# Patient Record
Sex: FEMALE | Race: WHITE | NOT HISPANIC OR LATINO | URBAN - NONMETROPOLITAN AREA
[De-identification: names, ages, dates, MRNs, and addresses within clinical notes are randomized per-mention and may not be internally consistent; named-entity substitution may affect disease eponyms.]

---

## 2023-10-06 ENCOUNTER — APPOINTMENT (OUTPATIENT)
Dept: RADIOLOGY | Facility: HOSPITAL | Age: 53
DRG: 177 | End: 2023-10-06
Payer: MEDICARE

## 2023-10-06 ENCOUNTER — HOSPITAL ENCOUNTER (INPATIENT)
Facility: HOSPITAL | Age: 53
LOS: 4 days | Discharge: HOME | DRG: 177 | End: 2023-10-10
Attending: EMERGENCY MEDICINE | Admitting: STUDENT IN AN ORGANIZED HEALTH CARE EDUCATION/TRAINING PROGRAM
Payer: MEDICARE

## 2023-10-06 ENCOUNTER — HOSPITAL ENCOUNTER (OUTPATIENT)
Dept: CARDIOLOGY | Facility: CLINIC | Age: 53
Discharge: HOME | End: 2023-10-06
Payer: MEDICARE

## 2023-10-06 DIAGNOSIS — E86.0 DEHYDRATION: ICD-10-CM

## 2023-10-06 DIAGNOSIS — G35 MULTIPLE SCLEROSIS (MULTI): ICD-10-CM

## 2023-10-06 DIAGNOSIS — J18.9 PNEUMONIA OF RIGHT LOWER LOBE DUE TO INFECTIOUS ORGANISM: Primary | ICD-10-CM

## 2023-10-06 DIAGNOSIS — N31.9 NEUROGENIC BLADDER: Chronic | ICD-10-CM

## 2023-10-06 PROBLEM — E43 SEVERE PROTEIN-CALORIE MALNUTRITION (MULTI): Status: ACTIVE | Noted: 2023-10-06

## 2023-10-06 PROBLEM — R47.01 NONVERBAL: Status: ACTIVE | Noted: 2023-10-06

## 2023-10-06 PROBLEM — D64.9 ANEMIA: Status: ACTIVE | Noted: 2023-10-06

## 2023-10-06 LAB
ALBUMIN SERPL BCP-MCNC: 4.2 G/DL
ALP SERPL-CCNC: 76 U/L
ALT SERPL W P-5'-P-CCNC: 23 U/L
ANION GAP SERPL CALC-SCNC: 15 MMOL/L
APPEARANCE UR: ABNORMAL
AST SERPL W P-5'-P-CCNC: 44 U/L
BASOPHILS # BLD AUTO: 0.01 X10*3/UL (ref 0–0.1)
BASOPHILS NFR BLD AUTO: 0.1 %
BILIRUB SERPL-MCNC: 0.8 MG/DL (ref 0–1.2)
BILIRUB UR STRIP.AUTO-MCNC: NEGATIVE MG/DL
BNP SERPL-MCNC: 329 PG/ML
BUN SERPL-MCNC: 24 MG/DL
CALCIUM SERPL-MCNC: 8.8 MG/DL
CARDIAC TROPONIN I PNL SERPL HS: 16 NG/L
CHLORIDE SERPL-SCNC: 84 MMOL/L
CO2 SERPL-SCNC: 28 MMOL/L
COLOR UR: YELLOW
CREAT SERPL-MCNC: 0.41 MG/DL
EOSINOPHIL # BLD AUTO: 0 X10*3/UL (ref 0–0.7)
EOSINOPHIL NFR BLD AUTO: 0 %
ERYTHROCYTE [DISTWIDTH] IN BLOOD BY AUTOMATED COUNT: 13.1 % (ref 11.5–14.5)
GFR SERPL CREATININE-BSD FRML MDRD: >90 ML/MIN/1.73M*2
GLUCOSE SERPL-MCNC: 97 MG/DL
GLUCOSE UR STRIP.AUTO-MCNC: NEGATIVE MG/DL
HCT VFR BLD AUTO: 26.8 % (ref 36–46)
HGB BLD-MCNC: 9.2 G/DL (ref 12–16)
IMM GRANULOCYTES # BLD AUTO: 0.04 X10*3/UL (ref 0–0.7)
IMM GRANULOCYTES NFR BLD AUTO: 0.3 % (ref 0–0.9)
KETONES UR STRIP.AUTO-MCNC: ABNORMAL MG/DL
LEUKOCYTE ESTERASE UR QL STRIP.AUTO: NEGATIVE
LYMPHOCYTES # BLD AUTO: 0.5 X10*3/UL (ref 1.2–4.8)
LYMPHOCYTES NFR BLD AUTO: 3.2 %
MAGNESIUM SERPL-MCNC: 1.59 MG/DL
MCH RBC QN AUTO: 30.4 PG (ref 26–34)
MCHC RBC AUTO-ENTMCNC: 34.3 G/DL (ref 32–36)
MCV RBC AUTO: 88 FL (ref 80–100)
MONOCYTES # BLD AUTO: 1.05 X10*3/UL (ref 0.1–1)
MONOCYTES NFR BLD AUTO: 6.8 %
MUCOUS THREADS #/AREA URNS AUTO: ABNORMAL /LPF
NEUTROPHILS # BLD AUTO: 13.92 X10*3/UL (ref 1.2–7.7)
NEUTROPHILS NFR BLD AUTO: 89.6 %
NITRITE UR QL STRIP.AUTO: NEGATIVE
NRBC BLD-RTO: 0 /100 WBCS (ref 0–0)
PH UR STRIP.AUTO: 5 [PH]
PLATELET # BLD AUTO: 165 X10*3/UL (ref 150–450)
PMV BLD AUTO: 9.5 FL (ref 7.5–11.5)
POTASSIUM SERPL-SCNC: 4.1 MMOL/L
PROT SERPL-MCNC: 7 G/DL
PROT UR STRIP.AUTO-MCNC: ABNORMAL MG/DL
RBC # BLD AUTO: 3.03 X10*6/UL (ref 4–5.2)
RBC # UR STRIP.AUTO: ABNORMAL /UL
RBC #/AREA URNS AUTO: ABNORMAL /HPF
RENAL EPI CELLS #/AREA UR COMP ASSIST: ABNORMAL /HPF
SARS-COV-2 RNA RESP QL NAA+PROBE: NOT DETECTED
SODIUM SERPL-SCNC: 123 MMOL/L
SP GR UR STRIP.AUTO: 1.02
SQUAMOUS #/AREA URNS AUTO: ABNORMAL /HPF
UROBILINOGEN UR STRIP.AUTO-MCNC: <2 MG/DL
WBC # BLD AUTO: 15.5 X10*3/UL (ref 4.4–11.3)
WBC #/AREA URNS AUTO: ABNORMAL /HPF

## 2023-10-06 PROCEDURE — 1200000002 HC GENERAL ROOM WITH TELEMETRY DAILY: Mod: IPSPLIT

## 2023-10-06 PROCEDURE — 83880 ASSAY OF NATRIURETIC PEPTIDE: CPT | Performed by: EMERGENCY MEDICINE

## 2023-10-06 PROCEDURE — 71045 X-RAY EXAM CHEST 1 VIEW: CPT | Performed by: RADIOLOGY

## 2023-10-06 PROCEDURE — 2500000001 HC RX 250 WO HCPCS SELF ADMINISTERED DRUGS (ALT 637 FOR MEDICARE OP): Mod: IPSPLIT | Performed by: STUDENT IN AN ORGANIZED HEALTH CARE EDUCATION/TRAINING PROGRAM

## 2023-10-06 PROCEDURE — 71045 X-RAY EXAM CHEST 1 VIEW: CPT | Mod: FY

## 2023-10-06 PROCEDURE — 99222 1ST HOSP IP/OBS MODERATE 55: CPT | Performed by: STUDENT IN AN ORGANIZED HEALTH CARE EDUCATION/TRAINING PROGRAM

## 2023-10-06 PROCEDURE — 2500000004 HC RX 250 GENERAL PHARMACY W/ HCPCS (ALT 636 FOR OP/ED)

## 2023-10-06 PROCEDURE — 99285 EMERGENCY DEPT VISIT HI MDM: CPT | Performed by: EMERGENCY MEDICINE

## 2023-10-06 PROCEDURE — 2500000004 HC RX 250 GENERAL PHARMACY W/ HCPCS (ALT 636 FOR OP/ED): Mod: IPSPLIT | Performed by: STUDENT IN AN ORGANIZED HEALTH CARE EDUCATION/TRAINING PROGRAM

## 2023-10-06 PROCEDURE — 70450 CT HEAD/BRAIN W/O DYE: CPT | Performed by: RADIOLOGY

## 2023-10-06 PROCEDURE — 82247 BILIRUBIN TOTAL: CPT | Performed by: EMERGENCY MEDICINE

## 2023-10-06 PROCEDURE — 70450 CT HEAD/BRAIN W/O DYE: CPT | Mod: MG

## 2023-10-06 PROCEDURE — 87086 URINE CULTURE/COLONY COUNT: CPT | Mod: CMCLAB,CONLAB,IPSPLIT | Performed by: EMERGENCY MEDICINE

## 2023-10-06 PROCEDURE — 84484 ASSAY OF TROPONIN QUANT: CPT | Performed by: EMERGENCY MEDICINE

## 2023-10-06 PROCEDURE — 87086 URINE CULTURE/COLONY COUNT: CPT | Mod: CMCLAB,CONLAB | Performed by: EMERGENCY MEDICINE

## 2023-10-06 PROCEDURE — G1004 CDSM NDSC: HCPCS

## 2023-10-06 PROCEDURE — 83735 ASSAY OF MAGNESIUM: CPT | Performed by: EMERGENCY MEDICINE

## 2023-10-06 PROCEDURE — 81001 URINALYSIS AUTO W/SCOPE: CPT | Performed by: EMERGENCY MEDICINE

## 2023-10-06 PROCEDURE — 85025 COMPLETE CBC W/AUTO DIFF WBC: CPT | Performed by: EMERGENCY MEDICINE

## 2023-10-06 PROCEDURE — 93005 ELECTROCARDIOGRAM TRACING: CPT

## 2023-10-06 PROCEDURE — 96365 THER/PROPH/DIAG IV INF INIT: CPT

## 2023-10-06 PROCEDURE — 87635 SARS-COV-2 COVID-19 AMP PRB: CPT | Performed by: EMERGENCY MEDICINE

## 2023-10-06 PROCEDURE — 36415 COLL VENOUS BLD VENIPUNCTURE: CPT | Performed by: EMERGENCY MEDICINE

## 2023-10-06 PROCEDURE — 2500000004 HC RX 250 GENERAL PHARMACY W/ HCPCS (ALT 636 FOR OP/ED): Performed by: EMERGENCY MEDICINE

## 2023-10-06 PROCEDURE — 80053 COMPREHEN METABOLIC PANEL: CPT | Performed by: EMERGENCY MEDICINE

## 2023-10-06 RX ORDER — MIRABEGRON 25 MG/1
25 TABLET, FILM COATED, EXTENDED RELEASE ORAL DAILY
Status: ON HOLD | COMMUNITY
End: 2023-10-06 | Stop reason: DRUGHIGH

## 2023-10-06 RX ORDER — MORPHINE SULFATE 4 MG/ML
4 INJECTION INTRAVENOUS EVERY 4 HOURS PRN
Status: DISCONTINUED | OUTPATIENT
Start: 2023-10-06 | End: 2023-10-10 | Stop reason: HOSPADM

## 2023-10-06 RX ORDER — ONDANSETRON 4 MG/1
8 TABLET, FILM COATED ORAL EVERY 8 HOURS PRN
COMMUNITY

## 2023-10-06 RX ORDER — ONDANSETRON HYDROCHLORIDE 2 MG/ML
4 INJECTION, SOLUTION INTRAVENOUS EVERY 8 HOURS PRN
Status: DISCONTINUED | OUTPATIENT
Start: 2023-10-06 | End: 2023-10-10 | Stop reason: HOSPADM

## 2023-10-06 RX ORDER — CEFTRIAXONE 2 G/50ML
2 INJECTION, SOLUTION INTRAVENOUS EVERY 24 HOURS
Status: COMPLETED | OUTPATIENT
Start: 2023-10-07 | End: 2023-10-09

## 2023-10-06 RX ORDER — MECLIZINE HYDROCHLORIDE 25 MG/1
25 TABLET ORAL 3 TIMES DAILY PRN
COMMUNITY

## 2023-10-06 RX ORDER — AZITHROMYCIN 100 MG/ML
INJECTION, POWDER, LYOPHILIZED, FOR SOLUTION INTRAVENOUS
Status: COMPLETED
Start: 2023-10-06 | End: 2023-10-06

## 2023-10-06 RX ORDER — SOLIFENACIN SUCCINATE 5 MG/1
10 TABLET, FILM COATED ORAL
Status: DISCONTINUED | OUTPATIENT
Start: 2023-10-08 | End: 2023-10-06

## 2023-10-06 RX ORDER — NITROFURANTOIN (MACROCRYSTALS) 100 MG/1
100 CAPSULE ORAL 4 TIMES DAILY
Status: ON HOLD | COMMUNITY
End: 2023-10-06 | Stop reason: DRUGHIGH

## 2023-10-06 RX ORDER — NITROFURANTOIN 25; 75 MG/1; MG/1
100 CAPSULE ORAL DAILY
Status: DISCONTINUED | OUTPATIENT
Start: 2023-10-07 | End: 2023-10-07

## 2023-10-06 RX ORDER — DEXTROSE MONOHYDRATE AND SODIUM CHLORIDE 5; .9 G/100ML; G/100ML
75 INJECTION, SOLUTION INTRAVENOUS CONTINUOUS
Status: DISCONTINUED | OUTPATIENT
Start: 2023-10-06 | End: 2023-10-09

## 2023-10-06 RX ORDER — KETOROLAC TROMETHAMINE 30 MG/ML
15 INJECTION, SOLUTION INTRAMUSCULAR; INTRAVENOUS EVERY 6 HOURS PRN
Status: DISCONTINUED | OUTPATIENT
Start: 2023-10-06 | End: 2023-10-07

## 2023-10-06 RX ORDER — PREGABALIN 100 MG/1
200 CAPSULE ORAL 2 TIMES DAILY
Status: DISCONTINUED | OUTPATIENT
Start: 2023-10-06 | End: 2023-10-10 | Stop reason: HOSPADM

## 2023-10-06 RX ORDER — MECLIZINE HYDROCHLORIDE 25 MG/1
25 TABLET ORAL 3 TIMES DAILY PRN
Status: DISCONTINUED | OUTPATIENT
Start: 2023-10-06 | End: 2023-10-10 | Stop reason: HOSPADM

## 2023-10-06 RX ORDER — ONDANSETRON 4 MG/1
4 TABLET, ORALLY DISINTEGRATING ORAL EVERY 8 HOURS PRN
Status: DISCONTINUED | OUTPATIENT
Start: 2023-10-06 | End: 2023-10-08 | Stop reason: SDUPTHER

## 2023-10-06 RX ORDER — OXYBUTYNIN CHLORIDE 5 MG/1
5 TABLET ORAL 2 TIMES DAILY
Status: DISCONTINUED | OUTPATIENT
Start: 2023-10-06 | End: 2023-10-10 | Stop reason: HOSPADM

## 2023-10-06 RX ORDER — ACETAMINOPHEN 325 MG/1
650 TABLET ORAL EVERY 4 HOURS PRN
Status: DISCONTINUED | OUTPATIENT
Start: 2023-10-06 | End: 2023-10-10 | Stop reason: HOSPADM

## 2023-10-06 RX ORDER — ACETAMINOPHEN 650 MG/1
650 SUPPOSITORY RECTAL EVERY 4 HOURS PRN
Status: DISCONTINUED | OUTPATIENT
Start: 2023-10-06 | End: 2023-10-07

## 2023-10-06 RX ORDER — TALC
3 POWDER (GRAM) TOPICAL DAILY
Status: DISCONTINUED | OUTPATIENT
Start: 2023-10-06 | End: 2023-10-10 | Stop reason: HOSPADM

## 2023-10-06 RX ORDER — SOLIFENACIN SUCCINATE 10 MG/1
10 TABLET, FILM COATED ORAL DAILY
Status: ON HOLD | COMMUNITY
End: 2023-10-06 | Stop reason: DRUGHIGH

## 2023-10-06 RX ORDER — POLYETHYLENE GLYCOL 3350 17 G/17G
17 POWDER, FOR SOLUTION ORAL DAILY
Status: DISCONTINUED | OUTPATIENT
Start: 2023-10-06 | End: 2023-10-10 | Stop reason: HOSPADM

## 2023-10-06 RX ORDER — PREGABALIN 200 MG/1
200 CAPSULE ORAL 2 TIMES DAILY
COMMUNITY

## 2023-10-06 RX ORDER — CEFTRIAXONE 1 G/50ML
1 INJECTION, SOLUTION INTRAVENOUS ONCE
Status: COMPLETED | OUTPATIENT
Start: 2023-10-06 | End: 2023-10-06

## 2023-10-06 RX ORDER — CEFTRIAXONE 2 G/50ML
2 INJECTION, SOLUTION INTRAVENOUS EVERY 24 HOURS
Status: DISCONTINUED | OUTPATIENT
Start: 2023-10-06 | End: 2023-10-06

## 2023-10-06 RX ORDER — ACETAMINOPHEN 160 MG/5ML
650 SOLUTION ORAL EVERY 4 HOURS PRN
Status: DISCONTINUED | OUTPATIENT
Start: 2023-10-06 | End: 2023-10-10 | Stop reason: HOSPADM

## 2023-10-06 RX ORDER — MIRABEGRON 25 MG/1
25 TABLET, FILM COATED, EXTENDED RELEASE ORAL
Status: DISCONTINUED | OUTPATIENT
Start: 2023-10-07 | End: 2023-10-06

## 2023-10-06 RX ADMIN — PREGABALIN 200 MG: 100 CAPSULE ORAL at 21:05

## 2023-10-06 RX ADMIN — MELATONIN TAB 3 MG 3 MG: 3 TAB at 21:05

## 2023-10-06 RX ADMIN — CEFTRIAXONE 1 G: 1 INJECTION, SOLUTION INTRAVENOUS at 12:15

## 2023-10-06 RX ADMIN — AZITHROMYCIN 500 MG: 500 INJECTION, POWDER, LYOPHILIZED, FOR SOLUTION INTRAVENOUS at 14:09

## 2023-10-06 RX ADMIN — SODIUM CHLORIDE 1000 ML: 9 INJECTION, SOLUTION INTRAVENOUS at 11:19

## 2023-10-06 RX ADMIN — OXYBUTYNIN CHLORIDE 5 MG: 5 TABLET ORAL at 21:05

## 2023-10-06 SDOH — SOCIAL STABILITY: SOCIAL INSECURITY: WERE YOU ABLE TO COMPLETE ALL THE BEHAVIORAL HEALTH SCREENINGS?: NO

## 2023-10-06 ASSESSMENT — COGNITIVE AND FUNCTIONAL STATUS - GENERAL
EATING MEALS: A LOT
TOILETING: TOTAL
PATIENT BASELINE BEDBOUND: NO
PERSONAL GROOMING: TOTAL
DRESSING REGULAR LOWER BODY CLOTHING: TOTAL
MOBILITY SCORE: 6
CLIMB 3 TO 5 STEPS WITH RAILING: TOTAL
MOVING TO AND FROM BED TO CHAIR: TOTAL
DAILY ACTIVITIY SCORE: 7
HELP NEEDED FOR BATHING: TOTAL
MOVING FROM LYING ON BACK TO SITTING ON SIDE OF FLAT BED WITH BEDRAILS: TOTAL
TURNING FROM BACK TO SIDE WHILE IN FLAT BAD: TOTAL
STANDING UP FROM CHAIR USING ARMS: TOTAL
WALKING IN HOSPITAL ROOM: TOTAL
DRESSING REGULAR UPPER BODY CLOTHING: TOTAL

## 2023-10-06 ASSESSMENT — ACTIVITIES OF DAILY LIVING (ADL)
JUDGMENT_ADEQUATE_SAFELY_COMPLETE_DAILY_ACTIVITIES: UNABLE TO ASSESS
HEARING - RIGHT EAR: FUNCTIONAL
ASSISTIVE_DEVICE: WHEELCHAIR
WALKS IN HOME: DEPENDENT
BATHING: DEPENDENT
TOILETING: DEPENDENT
FEEDING YOURSELF: DEPENDENT
GROOMING: DEPENDENT
ADEQUATE_TO_COMPLETE_ADL: YES
PATIENT'S MEMORY ADEQUATE TO SAFELY COMPLETE DAILY ACTIVITIES?: UNABLE TO ASSESS
HEARING - LEFT EAR: FUNCTIONAL
DRESSING YOURSELF: DEPENDENT

## 2023-10-06 ASSESSMENT — PAIN SCALES - PAIN ASSESSMENT IN ADVANCED DEMENTIA (PAINAD)
BODYLANGUAGE: RELAXED
BREATHING: NORMAL
FACIALEXPRESSION: SMILING OR INEXPRESSIVE
CONSOLABILITY: NO NEED TO CONSOLE
FACIALEXPRESSION: SMILING OR INEXPRESSIVE
TOTALSCORE: 0
CONSOLABILITY: NO NEED TO CONSOLE
BREATHING: NORMAL
TOTALSCORE: 0
BODYLANGUAGE: RELAXED

## 2023-10-06 ASSESSMENT — COLUMBIA-SUICIDE SEVERITY RATING SCALE - C-SSRS
6. HAVE YOU EVER DONE ANYTHING, STARTED TO DO ANYTHING, OR PREPARED TO DO ANYTHING TO END YOUR LIFE?: NO
1. IN THE PAST MONTH, HAVE YOU WISHED YOU WERE DEAD OR WISHED YOU COULD GO TO SLEEP AND NOT WAKE UP?: NO
2. HAVE YOU ACTUALLY HAD ANY THOUGHTS OF KILLING YOURSELF?: NO

## 2023-10-06 ASSESSMENT — PAIN - FUNCTIONAL ASSESSMENT
PAIN_FUNCTIONAL_ASSESSMENT: UNABLE TO SELF-REPORT
PAIN_FUNCTIONAL_ASSESSMENT: PAINAD (PAIN ASSESSMENT IN ADVANCED DEMENTIA SCALE)

## 2023-10-06 NOTE — ED PROVIDER NOTES
Department of Emergency Medicine   ED  Provider Note  Admit Date/RoomTime: 10/6/2023 10:25 AM  ED Room: 07/[address]              History of Present Illness:   Shameka Zaragoza is a 53 y.o. female presenting to the ED for decreased responsiveness, beginning last night.  The complaint has been persistent, moderate in severity, and worsened by nothing.  Patient with history of multiple sclerosis.  Patient has been seen here previously.  Nursing staff states this is similar to her usual presentation.  Patient is nonverbal.  She is unable to answer any questions.  There is no family members here at this time.  She does have a baclofen pump.  The  has arrived.  The  reports the patient has been less verbal.  Has been eating and drinking less.  Refusing some medications.  Has been less functional/interactive over the last few days.  No fever.      Review of Systems:   Pertinent positives and review of systems as noted above.  Remaining 10 review of systems is negative or noncontributory to today's episode of care.        --------------------------------------------- PAST HISTORY ---------------------------------------------  Past Medical History:  has a past medical history of Multiple sclerosis (CMS/[facility]).    Past Surgical History:  has no past surgical history on file.  No reported recent surgeries.    Social History:  reports that she has never smoked. She has never used smokeless tobacco.    Family History: family history is not on file. Unless otherwise noted, family history is non contributory    The patient’s home medications have been reviewed.    Allergies: Bactrim [sulfamethoxazole-trimethoprim], Prednisone, Sulfa (sulfonamide antibiotics), and Levaquin [levofloxacin]    -------------------------------------------------- RESULTS -------------------------------------------------  All laboratory and radiology results have been personally reviewed by myself   LABS:  Labs Reviewed    CBC WITH AUTO DIFFERENTIAL - Abnormal       Result Value    WBC 15.5 (*)     nRBC 0.0      RBC 3.03 (*)     Hemoglobin 9.2 (*)     Hematocrit 26.8 (*)     MCV 88      MCH 30.4      MCHC 34.3      RDW 13.1      Platelets 165      MPV 9.5      Neutrophils % 89.6      Immature Granulocytes %, Automated 0.3      Lymphocytes % 3.2      Monocytes % 6.8      Eosinophils % 0.0      Basophils % 0.1      Neutrophils Absolute 13.92 (*)     Immature Granulocytes Absolute, Automated 0.04      Lymphocytes Absolute 0.50 (*)     Monocytes Absolute 1.05 (*)     Eosinophils Absolute 0.00      Basophils Absolute 0.01     URINALYSIS WITH REFLEX MICROSCOPIC AND CULTURE - Abnormal    Color, Urine Yellow      Appearance, Urine Hazy (*)     Specific Gravity, Urine 1.023      pH, Urine 5.0      Protein, Urine 30 (1+) (*)     Glucose, Urine NEGATIVE      Blood, Urine SMALL (1+) (*)     Ketones, Urine 80 (2+) (*)     Bilirubin, Urine NEGATIVE      Urobilinogen, Urine <2.0      Nitrite, Urine NEGATIVE      Leukocyte Esterase, Urine NEGATIVE     URINALYSIS MICROSCOPIC WITH REFLEX CULTURE - Abnormal    WBC, Urine 11-20 (*)     RBC, Urine 1-2      Squamous Epithelial Cells, Urine 1-9 (SPARSE)      Renal Epithelial Cells, Urine 1-2 (FEW)      Mucus, Urine 1+     SARS-COV-2 PCR, SYMPTOMATIC - Normal    Coronavirus 2019, PCR Not Detected      Narrative:     This assay has received FDA Emergency Use Authorization (EUA) and is only authorized for the duration of time that circumstances exist to justify the authorization of the emergency use of in vitro diagnostic tests for the detection of SARS-CoV-2 virus and/or diagnosis of COVID-19 infection under section 564(b)(1) of the Act, 21 U.S.C. 360bbb-3(b)(1). This assay is an in vitro diagnostic nucleic acid amplification test for the qualitative detection of SARS-CoV-2 from nasopharyngeal specimens and has been validated for use at [facility]. Negative results do not preclude  COVID-19 infections and should not be used as the sole basis for diagnosis, treatment, or other management decisions.     URINE CULTURE   COMPREHENSIVE METABOLIC PANEL    Glucose 97      Sodium 123      Potassium 4.1      Chloride 84      Bicarbonate 28      Anion Gap 15      Urea Nitrogen 24      Creatinine 0.41      eGFR >90      Calcium 8.8      Albumin 4.2      Alkaline Phosphatase 76      Total Protein 7.0      AST 44      Bilirubin, Total 0.8      ALT 23     MAGNESIUM    Magnesium 1.59     TROPONIN I, HIGH SENSITIVITY    Troponin I, High Sensitivity 16      Narrative:     Less than 99th percentile of normal range cutoff-  Female and children under 18 years old <14 ng/L; Male <21 ng/L: Negative  Repeat testing should be performed if clinically indicated.     Female and children under 18 years old 14-50 ng/L; Male 21-50 ng/L:  Consistent with possible cardiac damage and possible increased clinical   risk. Serial measurements may help to assess extent of myocardial damage.     >50 ng/L: Consistent with cardiac damage, increased clinical risk and  myocardial infarction. Serial measurements may help assess extent of   myocardial damage.      NOTE: Children less than 1 year old may have higher baseline troponin   levels and results should be interpreted in conjunction with the overall   clinical context.     NOTE: Troponin I testing is performed using a different   testing methodology at [facility] than at other   [facility]. Direct result comparisons should only   be made within the same method.   B-TYPE NATRIURETIC PEPTIDE          Narrative:        <100 pg/mL - Heart failure unlikely  100-299 pg/mL - Intermediate probability of acute heart                  failure exacerbation. Correlate with clinical                  context and patient history.    >=300 pg/mL - Heart Failure likely. Correlate with clinical                  context and patient history.    BNP testing is performed using  "different testing methodology at [facility] than at other [facility]. Direct result comparisons should only be made within the same method.      URINALYSIS WITH REFLEX MICROSCOPIC AND CULTURE    Narrative:     The following orders were created for panel order Urinalysis with Reflex Microscopic and Culture.  Procedure                               Abnormality         Status                     ---------                               -----------         ------                     Urinalysis with Reflex M...[[id number]]  Abnormal            Final result               Extra Urine Gray Tube[[id number]]                            In process                   Please view results for these tests on the individual orders.   EXTRA URINE GRAY TUBE   GRAY TOP         RADIOLOGY:  Interpreted by Radiologist.  XR chest 1 view   Final Result   Right mid to lower lung pneumonia.             Signed by: Mushtaq Squires 10/6/2023 11:52 AM   Dictation workstation:   [id number]      CT head wo IV contrast   Final Result   No acute intracranial pathology identified.                  Signed by: Mushtaq Squires 10/6/2023 11:50 AM   Dictation workstation:   [id number]          No results found for this or any previous visit (from the past 4464 hour(s)).  ------------------------- NURSING NOTES AND VITALS REVIEWED ---------------------------   The nursing notes within the ED encounter and vital signs as below have been reviewed.   /54   Pulse 76   Temp 36.5 °C (97.7 °F)   Resp 16   Ht 1.6 m (5' 3\")   Wt 59.1 kg (130 lb 4.7 oz)   SpO2 100%   BMI 23.08 kg/m²   Oxygen Saturation Interpretation: Normal      ---------------------------------------------------PHYSICAL EXAM--------------------------------------    Constitutional/General: Alert and oriented x3, well appearing, non toxic in NAD  Head: Normocephalic and atraumatic  Eyes: PERRL, EOMI, conjunctiva normal, sclera non icteric  Mouth: Oropharynx clear, handling " secretions, no trismus, no asymmetry of the posterior oropharynx or uvular edema  Neck: Supple, full ROM, non tender to palpation in the midline, no stridor, no crepitus, no meningeal signs  Respiratory: Lungs clear to auscultation bilaterally, no wheezes, rales, or rhonchi. Not in respiratory distress  Cardiovascular:  Regular rate. Regular rhythm. No murmurs, gallops, or rubs. 2+ distal pulses  Chest: No chest wall tenderness  GI:  Abdomen Soft, Non tender, Non distended.  +BS. No organomegaly, no palpable masses,  No rebound, guarding, or rigidity.  No acute peritoneal signs.  Musculoskeletal: Moves all extremities x 4. Warm and well perfused, no clubbing, cyanosis, or edema. Capillary refill <3 seconds  Integument: skin warm and dry. No rashes.   Lymphatic: no lymphadenopathy noted  Neurologic: GCS 15, no focal deficits, symmetric strength 5/5 in the upper and lower extremities bilaterally  Psychiatric: Normal Affect    Procedures  An EKG at 1210 interpreted by me at 1215.  Normal sinus rhythm 91 bpm.  Normal axis.  MS, QRS, QT intervals are grossly normal.  No acute ST segment elevation or depression.  No evidence of acute ischemia.  No STEMI.  Baseline artifact.  ------------------------------ ED COURSE/MEDICAL DECISION MAKING----------------------    Medical Decision Making:   Patient arrives from home via EMS.  An IV is started.  Multiple labs are ordered.  A COVID is ordered.  I will give her 500 cc fluid bolus.  White count is 15,000, chemistries are unremarkable.  Chest x-ray shows right mid and lower lobe pneumonia.  Patient has advanced multiple sclerosis.  She is not eating or drinking or doing well at home.  I discussed with the family agreeable for admission.  I discussed with the on-call hospitalist Dr. Paula and accepted for admission    Diagnoses as of 10/06/23 1416   Pneumonia of right lower lobe due to infectious organism   Multiple sclerosis (CMS/[facility])   Dehydration      Counseling:   The  emergency provider has spoken with the family member patient and  and discussed today’s results, in addition to providing specific details for the plan of care and counseling regarding the diagnosis and prognosis.  Questions are answered at this time and they are agreeable with the plan.      --------------------------------- IMPRESSION AND DISPOSITION ---------------------------------        IMPRESSION  No diagnosis found.    DISPOSITION  Disposition: Admit to telemetry  Patient condition is stable      Billing Provider Critical Care Time: zerp minutes     Darryl Machado, DO  10/06/23 1418       Darryl Machado, DO  10/06/23 1555

## 2023-10-07 PROBLEM — N32.81 OVERACTIVE BLADDER: Status: ACTIVE | Noted: 2019-09-08

## 2023-10-07 PROBLEM — K21.9 GERD (GASTROESOPHAGEAL REFLUX DISEASE): Status: ACTIVE | Noted: 2023-10-07

## 2023-10-07 PROBLEM — M79.18 MYOFASCIAL PAIN: Status: ACTIVE | Noted: 2021-06-30

## 2023-10-07 PROBLEM — Z98.890 S/P INSERTION OF INTRATHECAL PUMP: Status: ACTIVE | Noted: 2021-06-30

## 2023-10-07 PROBLEM — M79.2 NEUROPATHIC PAIN: Status: ACTIVE | Noted: 2021-06-30

## 2023-10-07 LAB
ALBUMIN SERPL BCP-MCNC: 3.4 G/DL (ref 3.4–5)
ALBUMIN SERPL BCP-MCNC: 3.5 G/DL (ref 3.4–5)
ALP SERPL-CCNC: 58 U/L (ref 33–110)
ALT SERPL W P-5'-P-CCNC: 20 U/L (ref 7–45)
ANION GAP SERPL CALC-SCNC: 10 MMOL/L (ref 10–20)
ANION GAP SERPL CALC-SCNC: 13 MMOL/L (ref 10–20)
AST SERPL W P-5'-P-CCNC: 39 U/L (ref 9–39)
BACTERIA UR CULT: NO GROWTH
BILIRUB DIRECT SERPL-MCNC: 0.1 MG/DL (ref 0–0.3)
BILIRUB SERPL-MCNC: 0.5 MG/DL (ref 0–1.2)
BUN SERPL-MCNC: 11 MG/DL (ref 6–23)
BUN SERPL-MCNC: 14 MG/DL (ref 6–23)
CALCIUM SERPL-MCNC: 8.4 MG/DL (ref 8.6–10.3)
CALCIUM SERPL-MCNC: 8.4 MG/DL (ref 8.6–10.3)
CHLORIDE SERPL-SCNC: 101 MMOL/L (ref 98–107)
CHLORIDE SERPL-SCNC: 103 MMOL/L (ref 98–107)
CO2 SERPL-SCNC: 27 MMOL/L (ref 21–32)
CO2 SERPL-SCNC: 28 MMOL/L (ref 21–32)
CREAT SERPL-MCNC: 0.36 MG/DL (ref 0.5–1.05)
CREAT SERPL-MCNC: 0.36 MG/DL (ref 0.5–1.05)
ERYTHROCYTE [DISTWIDTH] IN BLOOD BY AUTOMATED COUNT: 13.7 % (ref 11.5–14.5)
GFR SERPL CREATININE-BSD FRML MDRD: >90 ML/MIN/1.73M*2
GFR SERPL CREATININE-BSD FRML MDRD: >90 ML/MIN/1.73M*2
GLUCOSE SERPL-MCNC: 124 MG/DL (ref 74–99)
GLUCOSE SERPL-MCNC: 81 MG/DL (ref 74–99)
HCT VFR BLD AUTO: 24.8 % (ref 36–46)
HGB BLD-MCNC: 8.3 G/DL (ref 12–16)
MAGNESIUM SERPL-MCNC: 1.64 MG/DL (ref 1.6–2.4)
MAGNESIUM SERPL-MCNC: 1.74 MG/DL (ref 1.6–2.4)
MCH RBC QN AUTO: 30.3 PG (ref 26–34)
MCHC RBC AUTO-ENTMCNC: 33.5 G/DL (ref 32–36)
MCV RBC AUTO: 91 FL (ref 80–100)
NRBC BLD-RTO: 0 /100 WBCS (ref 0–0)
PHOSPHATE SERPL-MCNC: 1.9 MG/DL (ref 2.5–4.9)
PHOSPHATE SERPL-MCNC: 2.4 MG/DL (ref 2.5–4.9)
PLATELET # BLD AUTO: 144 X10*3/UL (ref 150–450)
PMV BLD AUTO: 8.9 FL (ref 7.5–11.5)
POTASSIUM SERPL-SCNC: 2.8 MMOL/L (ref 3.5–5.3)
POTASSIUM SERPL-SCNC: 3.4 MMOL/L (ref 3.5–5.3)
PROT SERPL-MCNC: 5.9 G/DL (ref 6.4–8.2)
RBC # BLD AUTO: 2.74 X10*6/UL (ref 4–5.2)
SODIUM SERPL-SCNC: 138 MMOL/L (ref 136–145)
SODIUM SERPL-SCNC: 138 MMOL/L (ref 136–145)
TSH SERPL-ACNC: 2.07 MIU/L (ref 0.44–3.98)
WBC # BLD AUTO: 7.5 X10*3/UL (ref 4.4–11.3)

## 2023-10-07 PROCEDURE — 82374 ASSAY BLOOD CARBON DIOXIDE: CPT | Mod: IPSPLIT | Performed by: STUDENT IN AN ORGANIZED HEALTH CARE EDUCATION/TRAINING PROGRAM

## 2023-10-07 PROCEDURE — 82435 ASSAY OF BLOOD CHLORIDE: CPT | Mod: IPSPLIT | Performed by: STUDENT IN AN ORGANIZED HEALTH CARE EDUCATION/TRAINING PROGRAM

## 2023-10-07 PROCEDURE — 83735 ASSAY OF MAGNESIUM: CPT | Mod: IPSPLIT | Performed by: STUDENT IN AN ORGANIZED HEALTH CARE EDUCATION/TRAINING PROGRAM

## 2023-10-07 PROCEDURE — 84100 ASSAY OF PHOSPHORUS: CPT | Mod: IPSPLIT | Performed by: STUDENT IN AN ORGANIZED HEALTH CARE EDUCATION/TRAINING PROGRAM

## 2023-10-07 PROCEDURE — 84443 ASSAY THYROID STIM HORMONE: CPT | Mod: IPSPLIT | Performed by: STUDENT IN AN ORGANIZED HEALTH CARE EDUCATION/TRAINING PROGRAM

## 2023-10-07 PROCEDURE — 82040 ASSAY OF SERUM ALBUMIN: CPT | Mod: IPSPLIT | Performed by: STUDENT IN AN ORGANIZED HEALTH CARE EDUCATION/TRAINING PROGRAM

## 2023-10-07 PROCEDURE — 80053 COMPREHEN METABOLIC PANEL: CPT | Mod: IPSPLIT | Performed by: STUDENT IN AN ORGANIZED HEALTH CARE EDUCATION/TRAINING PROGRAM

## 2023-10-07 PROCEDURE — 36415 COLL VENOUS BLD VENIPUNCTURE: CPT | Mod: IPSPLIT | Performed by: STUDENT IN AN ORGANIZED HEALTH CARE EDUCATION/TRAINING PROGRAM

## 2023-10-07 PROCEDURE — 84075 ASSAY ALKALINE PHOSPHATASE: CPT | Mod: IPSPLIT | Performed by: STUDENT IN AN ORGANIZED HEALTH CARE EDUCATION/TRAINING PROGRAM

## 2023-10-07 PROCEDURE — 1200000002 HC GENERAL ROOM WITH TELEMETRY DAILY: Mod: IPSPLIT

## 2023-10-07 PROCEDURE — 2500000004 HC RX 250 GENERAL PHARMACY W/ HCPCS (ALT 636 FOR OP/ED): Mod: IPSPLIT | Performed by: STUDENT IN AN ORGANIZED HEALTH CARE EDUCATION/TRAINING PROGRAM

## 2023-10-07 PROCEDURE — 2500000005 HC RX 250 GENERAL PHARMACY W/O HCPCS: Mod: IPSPLIT | Performed by: STUDENT IN AN ORGANIZED HEALTH CARE EDUCATION/TRAINING PROGRAM

## 2023-10-07 PROCEDURE — 2500000001 HC RX 250 WO HCPCS SELF ADMINISTERED DRUGS (ALT 637 FOR MEDICARE OP): Mod: IPSPLIT | Performed by: STUDENT IN AN ORGANIZED HEALTH CARE EDUCATION/TRAINING PROGRAM

## 2023-10-07 PROCEDURE — 94760 N-INVAS EAR/PLS OXIMETRY 1: CPT | Mod: IPSPLIT

## 2023-10-07 PROCEDURE — 85027 COMPLETE CBC AUTOMATED: CPT | Mod: IPSPLIT | Performed by: STUDENT IN AN ORGANIZED HEALTH CARE EDUCATION/TRAINING PROGRAM

## 2023-10-07 PROCEDURE — 99232 SBSQ HOSP IP/OBS MODERATE 35: CPT | Performed by: STUDENT IN AN ORGANIZED HEALTH CARE EDUCATION/TRAINING PROGRAM

## 2023-10-07 PROCEDURE — 80069 RENAL FUNCTION PANEL: CPT | Mod: IPSPLIT | Performed by: STUDENT IN AN ORGANIZED HEALTH CARE EDUCATION/TRAINING PROGRAM

## 2023-10-07 RX ORDER — ENOXAPARIN SODIUM 100 MG/ML
40 INJECTION SUBCUTANEOUS DAILY
Status: DISCONTINUED | OUTPATIENT
Start: 2023-10-08 | End: 2023-10-10 | Stop reason: HOSPADM

## 2023-10-07 RX ORDER — POTASSIUM CHLORIDE 14.9 MG/ML
20 INJECTION INTRAVENOUS
Status: COMPLETED | OUTPATIENT
Start: 2023-10-07 | End: 2023-10-07

## 2023-10-07 RX ADMIN — DEXTROSE AND SODIUM CHLORIDE 75 ML/HR: 5; 900 INJECTION, SOLUTION INTRAVENOUS at 16:09

## 2023-10-07 RX ADMIN — AZITHROMYCIN 500 MG: 500 INJECTION, POWDER, LYOPHILIZED, FOR SOLUTION INTRAVENOUS at 13:21

## 2023-10-07 RX ADMIN — POTASSIUM PHOSPHATE, MONOBASIC POTASSIUM PHOSPHATE, DIBASIC 21 MMOL: 224; 236 INJECTION, SOLUTION, CONCENTRATE INTRAVENOUS at 14:21

## 2023-10-07 RX ADMIN — DEXTROSE AND SODIUM CHLORIDE 75 ML/HR: 5; 900 INJECTION, SOLUTION INTRAVENOUS at 02:02

## 2023-10-07 RX ADMIN — POTASSIUM CHLORIDE 20 MEQ: 14.9 INJECTION, SOLUTION INTRAVENOUS at 09:20

## 2023-10-07 RX ADMIN — POTASSIUM CHLORIDE 20 MEQ: 14.9 INJECTION, SOLUTION INTRAVENOUS at 11:36

## 2023-10-07 RX ADMIN — CEFTRIAXONE 2 G: 2 INJECTION, SOLUTION INTRAVENOUS at 11:41

## 2023-10-07 SDOH — SOCIAL STABILITY: SOCIAL INSECURITY: DO YOU FEEL UNSAFE GOING BACK TO THE PLACE WHERE YOU ARE LIVING?: NO

## 2023-10-07 SDOH — SOCIAL STABILITY: SOCIAL INSECURITY: ABUSE: ADULT

## 2023-10-07 SDOH — SOCIAL STABILITY: SOCIAL INSECURITY: ARE THERE ANY APPARENT SIGNS OF INJURIES/BEHAVIORS THAT COULD BE RELATED TO ABUSE/NEGLECT?: NO

## 2023-10-07 SDOH — SOCIAL STABILITY: SOCIAL INSECURITY: ARE YOU OR HAVE YOU BEEN THREATENED OR ABUSED PHYSICALLY, EMOTIONALLY, OR SEXUALLY BY ANYONE?: NO

## 2023-10-07 SDOH — SOCIAL STABILITY: SOCIAL INSECURITY: DOES ANYONE TRY TO KEEP YOU FROM HAVING/CONTACTING OTHER FRIENDS OR DOING THINGS OUTSIDE YOUR HOME?: NO

## 2023-10-07 SDOH — SOCIAL STABILITY: SOCIAL INSECURITY: HAS ANYONE EVER THREATENED TO HURT YOUR FAMILY OR YOUR PETS?: NO

## 2023-10-07 SDOH — SOCIAL STABILITY: SOCIAL INSECURITY: DO YOU FEEL ANYONE HAS EXPLOITED OR TAKEN ADVANTAGE OF YOU FINANCIALLY OR OF YOUR PERSONAL PROPERTY?: NO

## 2023-10-07 SDOH — SOCIAL STABILITY: SOCIAL INSECURITY: HAVE YOU HAD THOUGHTS OF HARMING ANYONE ELSE?: NO

## 2023-10-07 ASSESSMENT — PAIN SCALES - PAIN ASSESSMENT IN ADVANCED DEMENTIA (PAINAD)
CONSOLABILITY: NO NEED TO CONSOLE
FACIALEXPRESSION: SMILING OR INEXPRESSIVE
TOTALSCORE: 0
BREATHING: NORMAL
CONSOLABILITY: NO NEED TO CONSOLE
FACIALEXPRESSION: SMILING OR INEXPRESSIVE
BODYLANGUAGE: RELAXED
BREATHING: NORMAL
TOTALSCORE: 0
BODYLANGUAGE: RELAXED

## 2023-10-07 ASSESSMENT — COGNITIVE AND FUNCTIONAL STATUS - GENERAL
WALKING IN HOSPITAL ROOM: TOTAL
DAILY ACTIVITIY SCORE: 7
PERSONAL GROOMING: TOTAL
DRESSING REGULAR UPPER BODY CLOTHING: TOTAL
TURNING FROM BACK TO SIDE WHILE IN FLAT BAD: TOTAL
MOVING FROM LYING ON BACK TO SITTING ON SIDE OF FLAT BED WITH BEDRAILS: TOTAL
WALKING IN HOSPITAL ROOM: TOTAL
DRESSING REGULAR LOWER BODY CLOTHING: TOTAL
TURNING FROM BACK TO SIDE WHILE IN FLAT BAD: TOTAL
TOILETING: TOTAL
TOILETING: TOTAL
HELP NEEDED FOR BATHING: TOTAL
HELP NEEDED FOR BATHING: TOTAL
STANDING UP FROM CHAIR USING ARMS: TOTAL
MOBILITY SCORE: 6
PERSONAL GROOMING: TOTAL
MOVING FROM LYING ON BACK TO SITTING ON SIDE OF FLAT BED WITH BEDRAILS: TOTAL
MOBILITY SCORE: 6
MOVING TO AND FROM BED TO CHAIR: TOTAL
EATING MEALS: A LOT
EATING MEALS: A LOT
DRESSING REGULAR UPPER BODY CLOTHING: TOTAL
STANDING UP FROM CHAIR USING ARMS: TOTAL
DAILY ACTIVITIY SCORE: 7
DRESSING REGULAR LOWER BODY CLOTHING: TOTAL
CLIMB 3 TO 5 STEPS WITH RAILING: TOTAL
MOVING TO AND FROM BED TO CHAIR: TOTAL
CLIMB 3 TO 5 STEPS WITH RAILING: TOTAL

## 2023-10-07 ASSESSMENT — ACTIVITIES OF DAILY LIVING (ADL)
ASSISTIVE_DEVICE: WHEELCHAIR
TOILETING: DEPENDENT
WALKS IN HOME: DEPENDENT
PATIENT'S MEMORY ADEQUATE TO SAFELY COMPLETE DAILY ACTIVITIES?: UNABLE TO ASSESS
BATHING: DEPENDENT
HEARING - RIGHT EAR: FUNCTIONAL
ADEQUATE_TO_COMPLETE_ADL: YES
JUDGMENT_ADEQUATE_SAFELY_COMPLETE_DAILY_ACTIVITIES: UNABLE TO ASSESS
GROOMING: DEPENDENT
HEARING - LEFT EAR: FUNCTIONAL
DRESSING YOURSELF: DEPENDENT
FEEDING YOURSELF: NEEDS ASSISTANCE

## 2023-10-07 ASSESSMENT — PATIENT HEALTH QUESTIONNAIRE - PHQ9
1. LITTLE INTEREST OR PLEASURE IN DOING THINGS: NOT AT ALL
2. FEELING DOWN, DEPRESSED OR HOPELESS: NOT AT ALL
SUM OF ALL RESPONSES TO PHQ9 QUESTIONS 1 & 2: 0

## 2023-10-07 ASSESSMENT — LIFESTYLE VARIABLES
AUDIT-C TOTAL SCORE: 0
AUDIT-C TOTAL SCORE: 0
HOW OFTEN DO YOU HAVE 6 OR MORE DRINKS ON ONE OCCASION: NEVER
HOW OFTEN DO YOU HAVE A DRINK CONTAINING ALCOHOL: NEVER
SKIP TO QUESTIONS 9-10: 1
HOW MANY STANDARD DRINKS CONTAINING ALCOHOL DO YOU HAVE ON A TYPICAL DAY: PATIENT DOES NOT DRINK

## 2023-10-07 ASSESSMENT — PAIN - FUNCTIONAL ASSESSMENT
PAIN_FUNCTIONAL_ASSESSMENT: PAINAD (PAIN ASSESSMENT IN ADVANCED DEMENTIA SCALE)
PAIN_FUNCTIONAL_ASSESSMENT: PAINAD (PAIN ASSESSMENT IN ADVANCED DEMENTIA SCALE)

## 2023-10-07 NOTE — NURSING NOTE
Polo cath 14Fr inserted d/t neurogenic bladder from MS diagnosis.  at bedside states he straight cath patient at home. Bladder scan done and showed > 555ml. Patient tolerated procedure well.

## 2023-10-07 NOTE — CARE PLAN
The patient's goals for the shift include  Safety, comfort, and fall prevention.    The clinical goals for the shift include      Over the shift, the patient rested, tolerated IV ATB and when wake spoke a couple words. Patient remained NPO this shift Call light in reach.

## 2023-10-07 NOTE — NURSING NOTE
2113 pt awake and alert resting in bed. Pt is verbal with nurse and appears calm. Evening meds administered with small sips of water, no coughing noted. IV clear and patent. Lung sounds diminished. Personal items and call light within reach. Denies any pain or needs att.

## 2023-10-08 PROBLEM — M79.18 MYOFASCIAL PAIN: Chronic | Status: ACTIVE | Noted: 2021-06-30

## 2023-10-08 PROBLEM — D64.9 ANEMIA: Chronic | Status: ACTIVE | Noted: 2023-10-06

## 2023-10-08 PROBLEM — R13.10 DYSPHAGIA: Status: ACTIVE | Noted: 2023-10-08

## 2023-10-08 PROBLEM — Z98.890 S/P INSERTION OF INTRATHECAL PUMP: Chronic | Status: ACTIVE | Noted: 2021-06-30

## 2023-10-08 PROBLEM — G35 MULTIPLE SCLEROSIS (MULTI): Chronic | Status: ACTIVE | Noted: 2023-10-06

## 2023-10-08 PROBLEM — K21.9 GERD (GASTROESOPHAGEAL REFLUX DISEASE): Chronic | Status: ACTIVE | Noted: 2023-10-07

## 2023-10-08 PROBLEM — M79.2 NEUROPATHIC PAIN: Chronic | Status: ACTIVE | Noted: 2021-06-30

## 2023-10-08 PROBLEM — N32.81 OVERACTIVE BLADDER: Chronic | Status: ACTIVE | Noted: 2019-09-08

## 2023-10-08 PROBLEM — E86.0 DEHYDRATION: Status: RESOLVED | Noted: 2023-10-06 | Resolved: 2023-10-08

## 2023-10-08 LAB
ALBUMIN SERPL BCP-MCNC: 3.2 G/DL (ref 3.4–5)
ALP SERPL-CCNC: 54 U/L (ref 33–110)
ALT SERPL W P-5'-P-CCNC: 18 U/L (ref 7–45)
ANION GAP SERPL CALC-SCNC: 10 MMOL/L (ref 10–20)
AST SERPL W P-5'-P-CCNC: 23 U/L (ref 9–39)
BILIRUB DIRECT SERPL-MCNC: 0.1 MG/DL (ref 0–0.3)
BILIRUB SERPL-MCNC: 0.5 MG/DL (ref 0–1.2)
BUN SERPL-MCNC: 9 MG/DL (ref 6–23)
CALCIUM SERPL-MCNC: 8.3 MG/DL (ref 8.6–10.3)
CHLORIDE SERPL-SCNC: 104 MMOL/L (ref 98–107)
CO2 SERPL-SCNC: 29 MMOL/L (ref 21–32)
CREAT SERPL-MCNC: 0.31 MG/DL (ref 0.5–1.05)
ERYTHROCYTE [DISTWIDTH] IN BLOOD BY AUTOMATED COUNT: 13.8 % (ref 11.5–14.5)
GFR SERPL CREATININE-BSD FRML MDRD: >90 ML/MIN/1.73M*2
GLUCOSE SERPL-MCNC: 106 MG/DL (ref 74–99)
HCT VFR BLD AUTO: 27.3 % (ref 36–46)
HGB BLD-MCNC: 8.9 G/DL (ref 12–16)
MAGNESIUM SERPL-MCNC: 1.6 MG/DL (ref 1.6–2.4)
MCH RBC QN AUTO: 30 PG (ref 26–34)
MCHC RBC AUTO-ENTMCNC: 32.6 G/DL (ref 32–36)
MCV RBC AUTO: 92 FL (ref 80–100)
NRBC BLD-RTO: 0 /100 WBCS (ref 0–0)
PHOSPHATE SERPL-MCNC: 2.2 MG/DL (ref 2.5–4.9)
PLATELET # BLD AUTO: 162 X10*3/UL (ref 150–450)
PMV BLD AUTO: 9.8 FL (ref 7.5–11.5)
POTASSIUM SERPL-SCNC: 3.2 MMOL/L (ref 3.5–5.3)
PROT SERPL-MCNC: 5.7 G/DL (ref 6.4–8.2)
RBC # BLD AUTO: 2.97 X10*6/UL (ref 4–5.2)
SODIUM SERPL-SCNC: 140 MMOL/L (ref 136–145)
WBC # BLD AUTO: 6.9 X10*3/UL (ref 4.4–11.3)

## 2023-10-08 PROCEDURE — 85027 COMPLETE CBC AUTOMATED: CPT | Mod: IPSPLIT | Performed by: STUDENT IN AN ORGANIZED HEALTH CARE EDUCATION/TRAINING PROGRAM

## 2023-10-08 PROCEDURE — 80048 BASIC METABOLIC PNL TOTAL CA: CPT | Mod: IPSPLIT | Performed by: STUDENT IN AN ORGANIZED HEALTH CARE EDUCATION/TRAINING PROGRAM

## 2023-10-08 PROCEDURE — 2500000001 HC RX 250 WO HCPCS SELF ADMINISTERED DRUGS (ALT 637 FOR MEDICARE OP): Mod: IPSPLIT | Performed by: STUDENT IN AN ORGANIZED HEALTH CARE EDUCATION/TRAINING PROGRAM

## 2023-10-08 PROCEDURE — 82248 BILIRUBIN DIRECT: CPT | Mod: IPSPLIT | Performed by: STUDENT IN AN ORGANIZED HEALTH CARE EDUCATION/TRAINING PROGRAM

## 2023-10-08 PROCEDURE — 94760 N-INVAS EAR/PLS OXIMETRY 1: CPT | Mod: IPSPLIT

## 2023-10-08 PROCEDURE — 83735 ASSAY OF MAGNESIUM: CPT | Mod: IPSPLIT | Performed by: STUDENT IN AN ORGANIZED HEALTH CARE EDUCATION/TRAINING PROGRAM

## 2023-10-08 PROCEDURE — 96372 THER/PROPH/DIAG INJ SC/IM: CPT | Mod: IPSPLIT | Performed by: STUDENT IN AN ORGANIZED HEALTH CARE EDUCATION/TRAINING PROGRAM

## 2023-10-08 PROCEDURE — 99232 SBSQ HOSP IP/OBS MODERATE 35: CPT | Performed by: STUDENT IN AN ORGANIZED HEALTH CARE EDUCATION/TRAINING PROGRAM

## 2023-10-08 PROCEDURE — 1200000002 HC GENERAL ROOM WITH TELEMETRY DAILY: Mod: IPSPLIT

## 2023-10-08 PROCEDURE — 84100 ASSAY OF PHOSPHORUS: CPT | Mod: IPSPLIT | Performed by: STUDENT IN AN ORGANIZED HEALTH CARE EDUCATION/TRAINING PROGRAM

## 2023-10-08 PROCEDURE — 2500000005 HC RX 250 GENERAL PHARMACY W/O HCPCS: Mod: IPSPLIT | Performed by: NURSE PRACTITIONER

## 2023-10-08 PROCEDURE — 2500000004 HC RX 250 GENERAL PHARMACY W/ HCPCS (ALT 636 FOR OP/ED): Mod: IPSPLIT | Performed by: NURSE PRACTITIONER

## 2023-10-08 PROCEDURE — 36415 COLL VENOUS BLD VENIPUNCTURE: CPT | Mod: IPSPLIT | Performed by: STUDENT IN AN ORGANIZED HEALTH CARE EDUCATION/TRAINING PROGRAM

## 2023-10-08 PROCEDURE — 2500000004 HC RX 250 GENERAL PHARMACY W/ HCPCS (ALT 636 FOR OP/ED): Mod: IPSPLIT | Performed by: STUDENT IN AN ORGANIZED HEALTH CARE EDUCATION/TRAINING PROGRAM

## 2023-10-08 RX ADMIN — DEXTROSE AND SODIUM CHLORIDE 75 ML/HR: 5; 900 INJECTION, SOLUTION INTRAVENOUS at 05:55

## 2023-10-08 RX ADMIN — PREGABALIN 200 MG: 100 CAPSULE ORAL at 20:49

## 2023-10-08 RX ADMIN — OXYBUTYNIN CHLORIDE 5 MG: 5 TABLET ORAL at 20:49

## 2023-10-08 RX ADMIN — MELATONIN TAB 3 MG 3 MG: 3 TAB at 20:49

## 2023-10-08 RX ADMIN — AZITHROMYCIN 500 MG: 500 INJECTION, POWDER, LYOPHILIZED, FOR SOLUTION INTRAVENOUS at 14:25

## 2023-10-08 RX ADMIN — DEXTROSE AND SODIUM CHLORIDE 75 ML/HR: 5; 900 INJECTION, SOLUTION INTRAVENOUS at 20:49

## 2023-10-08 RX ADMIN — ENOXAPARIN SODIUM 40 MG: 100 INJECTION SUBCUTANEOUS at 09:23

## 2023-10-08 RX ADMIN — POTASSIUM PHOSPHATE, MONOBASIC POTASSIUM PHOSPHATE, DIBASIC 21 MMOL: 224; 236 INJECTION, SOLUTION, CONCENTRATE INTRAVENOUS at 13:18

## 2023-10-08 RX ADMIN — CEFTRIAXONE 2 G: 2 INJECTION, SOLUTION INTRAVENOUS at 11:49

## 2023-10-08 ASSESSMENT — COGNITIVE AND FUNCTIONAL STATUS - GENERAL
PERSONAL GROOMING: TOTAL
CLIMB 3 TO 5 STEPS WITH RAILING: TOTAL
HELP NEEDED FOR BATHING: TOTAL
CLIMB 3 TO 5 STEPS WITH RAILING: TOTAL
MOVING FROM LYING ON BACK TO SITTING ON SIDE OF FLAT BED WITH BEDRAILS: TOTAL
MOVING FROM LYING ON BACK TO SITTING ON SIDE OF FLAT BED WITH BEDRAILS: TOTAL
TURNING FROM BACK TO SIDE WHILE IN FLAT BAD: TOTAL
DRESSING REGULAR UPPER BODY CLOTHING: TOTAL
MOVING TO AND FROM BED TO CHAIR: TOTAL
MOBILITY SCORE: 6
PERSONAL GROOMING: TOTAL
EATING MEALS: A LOT
TOILETING: TOTAL
WALKING IN HOSPITAL ROOM: TOTAL
DRESSING REGULAR UPPER BODY CLOTHING: TOTAL
WALKING IN HOSPITAL ROOM: TOTAL
MOBILITY SCORE: 6
TURNING FROM BACK TO SIDE WHILE IN FLAT BAD: TOTAL
HELP NEEDED FOR BATHING: TOTAL
TOILETING: TOTAL
DAILY ACTIVITIY SCORE: 7
DAILY ACTIVITIY SCORE: 7
DRESSING REGULAR LOWER BODY CLOTHING: TOTAL
STANDING UP FROM CHAIR USING ARMS: TOTAL
MOVING TO AND FROM BED TO CHAIR: TOTAL
STANDING UP FROM CHAIR USING ARMS: TOTAL
EATING MEALS: A LOT
DRESSING REGULAR LOWER BODY CLOTHING: TOTAL

## 2023-10-08 ASSESSMENT — PAIN SCALES - GENERAL
PAINLEVEL_OUTOF10: 0 - NO PAIN
PAINLEVEL_OUTOF10: 0 - NO PAIN

## 2023-10-08 ASSESSMENT — PAIN - FUNCTIONAL ASSESSMENT
PAIN_FUNCTIONAL_ASSESSMENT: 0-10
PAIN_FUNCTIONAL_ASSESSMENT: 0-10

## 2023-10-08 NOTE — CARE PLAN
The patient's goals for the shift include      The clinical goals for the shift include Pt tolerateIV ATB without difficulty.    Over the shift, the patient did not make progress toward the following goals. Barriers to progression include afebrile. Recommendations to address these barriers include continue IV ABX, monitor VS, labs.

## 2023-10-08 NOTE — ASSESSMENT & PLAN NOTE
- Poor ED & SNF report she is eating and drinking less  - PNA suspicious for aspiration  - SLP eval pending  - Keep NPO

## 2023-10-08 NOTE — H&P
"History of Present Illness  Shameka Zaragoza is a 53 y.o. female  with PMHx significant for Multiple Sclerosis, Neurogenic Bladder w/ chronic johns,  HTN, GERD , Anemia, and Neuropathic Pain who was brought to [facility] ED due to concern for change in mentation and decreased oral intake. She has advanced MS and unable to provide history,  was not at bedside.    Unable to obtain 12 Point ROS due to nonverbal baseline.    ED Course:  Lab work-up:  - Unremarkable CMP  - CBC revealed leukocytosis and normocytic anemia. Urinalysis suggestive of starvation ketosis  Imaging:  - CXR revealed right lung PNA  - CT Head unremarkable  Intervention:  - IVF and empiric antibiotics     Past Medical History  Past Medical History:   Diagnosis Date    Dehydration 10/06/2023    Multiple sclerosis (CMS/HCC)        Surgical History  History reviewed. No pertinent surgical history.     Social History  She reports that she has never smoked. She has never used smokeless tobacco. No history on file for alcohol use and drug use.    Allergies  Bactrim [sulfamethoxazole-trimethoprim], Prednisone, Sulfa (sulfonamide antibiotics), and Levaquin [levofloxacin]    Review of Systems     Physical Exam     Last Recorded Vitals  Blood pressure 111/60, pulse 73, temperature 36.6 °C (97.8 °F), temperature source Temporal, resp. rate 18, height 1.651 m (5' 5\"), weight 58.5 kg (128 lb 15.5 oz), SpO2 96 %.    Relevant Results  Scheduled medications  azithromycin, 500 mg, intravenous, Daily  cefTRIAXone, 2 g, intravenous, q24h  enoxaparin, 40 mg, subcutaneous, Daily  melatonin, 3 mg, oral, Daily  oxybutynin, 5 mg, oral, BID  polyethylene glycol, 17 g, oral, Daily  pregabalin, 200 mg, oral, BID      Continuous medications  D5 % and 0.9 % sodium chloride, 75 mL/hr, Last Rate: 75 mL/hr (10/08/23 0555)      PRN medications  PRN medications: acetaminophen **OR** acetaminophen **OR** [DISCONTINUED] acetaminophen, meclizine, morphine, ondansetron ODT " **OR** ondansetron     Results for orders placed or performed during the hospital encounter of 10/06/23 (from the past 24 hour(s))   CBC   Result Value Ref Range    WBC 7.5 4.4 - 11.3 x10*3/uL    nRBC 0.0 0.0 - 0.0 /100 WBCs    RBC 2.74 (L) 4.00 - 5.20 x10*6/uL    Hemoglobin 8.3 (L) 12.0 - 16.0 g/dL    Hematocrit 24.8 (L) 36.0 - 46.0 %    MCV 91 80 - 100 fL    MCH 30.3 26.0 - 34.0 pg    MCHC 33.5 32.0 - 36.0 g/dL    RDW 13.7 11.5 - 14.5 %    Platelets 144 (L) 150 - 450 x10*3/uL    MPV 8.9 7.5 - 11.5 fL   Hepatic Function Panel   Result Value Ref Range    Albumin 3.5 3.4 - 5.0 g/dL    Bilirubin, Total 0.5 0.0 - 1.2 mg/dL    Bilirubin, Direct 0.1 0.0 - 0.3 mg/dL    Alkaline Phosphatase 58 33 - 110 U/L    ALT 20 7 - 45 U/L    AST 39 9 - 39 U/L    Total Protein 5.9 (L) 6.4 - 8.2 g/dL   Magnesium   Result Value Ref Range    Magnesium 1.74 1.60 - 2.40 mg/dL   Phosphorus   Result Value Ref Range    Phosphorus 1.9 (L) 2.5 - 4.9 mg/dL   Basic Metabolic Panel   Result Value Ref Range    Glucose 81 74 - 99 mg/dL    Sodium 138 136 - 145 mmol/L    Potassium 2.8 (LL) 3.5 - 5.3 mmol/L    Chloride 101 98 - 107 mmol/L    Bicarbonate 27 21 - 32 mmol/L    Anion Gap 13 10 - 20 mmol/L    Urea Nitrogen 14 6 - 23 mg/dL    Creatinine 0.36 (L) 0.50 - 1.05 mg/dL    eGFR >90 >60 mL/min/1.73m*2    Calcium 8.4 (L) 8.6 - 10.3 mg/dL   Renal function panel   Result Value Ref Range    Glucose 124 (H) 74 - 99 mg/dL    Sodium 138 136 - 145 mmol/L    Potassium 3.4 (L) 3.5 - 5.3 mmol/L    Chloride 103 98 - 107 mmol/L    Bicarbonate 28 21 - 32 mmol/L    Anion Gap 10 10 - 20 mmol/L    Urea Nitrogen 11 6 - 23 mg/dL    Creatinine 0.36 (L) 0.50 - 1.05 mg/dL    eGFR >90 >60 mL/min/1.73m*2    Calcium 8.4 (L) 8.6 - 10.3 mg/dL    Phosphorus 2.4 (L) 2.5 - 4.9 mg/dL    Albumin 3.4 3.4 - 5.0 g/dL   Magnesium   Result Value Ref Range    Magnesium 1.64 1.60 - 2.40 mg/dL   TSH   Result Value Ref Range    Thyroid Stimulating Hormone 2.07 0.44 - 3.98 mIU/L         Imaging  XR chest 1 view    Result Date: 10/6/2023  Interpreted By:  Mushtaq Squires, STUDY: XR CHEST 1 VIEW;  10/6/2023 11:37 am   INDICATION: Signs/Symptoms:cough, altered mental status.   COMPARISON: 09/04/2023   ACCESSION NUMBER(S): [id number]   ORDERING CLINICIAN: JENA CHU   FINDINGS: Patchy consolidation right mid to lower lung zone. The left lung appears clear. No visualized pleural effusion. Unremarkable cardiomediastinal silhouette. No pulmonary vascular congestion.       Right mid to lower lung pneumonia.     Signed by: Mushtaq Squires 10/6/2023 11:52 AM Dictation workstation:   [id number]    CT head wo IV contrast    Result Date: 10/6/2023  Interpreted By:  Mushtaq Squires, STUDY: CT HEAD WO IV CONTRAST;  10/6/2023 11:30 am   INDICATION: pain.   COMPARISON: 09/04/2023   ACCESSION NUMBER(S): [id number]   ORDERING CLINICIAN: JENA CHU   TECHNIQUE: Contiguous axial images of the head obtained without contrast.   FINDINGS: Slightly motion degraded exam.   INTRACRANIAL: Focal white matter low-attenuation in the right frontal periventricular region is nonspecific in unchanged compared to prior exams. No acute intracranial hemorrhage or mass effect. No midline shift. Patent basal cisterns. No abnormal extra axial fluid collections. The grey-white differentiation is preserved. The calvaria appears intact.   EXTRACRANIAL: Visualized paranasal sinuses and mastoids are clear.       No acute intracranial pathology identified.       Signed by: Mushtaq Squires 10/6/2023 11:50 AM Dictation workstation:   [id number]       Assessment/Plan  Assessment/Plan            Pneumonia of right lower lobe due to infectious organism  IV Rocephin 2g + 3 day course of IV Azithromycin      Dysphagia  - Poor ED & SNF report she is eating and drinking less  - PNA suspicious for aspiration  - SLP eval pending  - Keep NPO    Severe protein-calorie malnutrition (CMS/HCC)  - Dietitian consulted     Continue home medications for  chronic conditions below:  Chronic Medical Conditions      GERD (gastroesophageal reflux disease) (Chronic)    Multiple sclerosis (CMS/HCC) (Chronic)    Anemia (Chronic)    Myofascial pain (Chronic)    Neuropathic pain (Chronic)    S/P insertion of intrathecal pump (Chronic)    Overactive bladder (Chronic)    Neurogenic bladder (Chronic)        Disposition: Patient admitted for management of aspiration PNA & dysphagia. Estimated LOS > 2 midnights.    I spent 60 minutes in the professional and overall care of this patient.      Delano Paula, DO  Internal Medicine

## 2023-10-08 NOTE — PROGRESS NOTES
"Subjective  Patient unable to answer questions due to her MS       Last Recorded Vitals  Blood pressure 111/60, pulse 73, temperature 36.6 °C (97.8 °F), temperature source Temporal, resp. rate 18, height 1.651 m (5' 5\"), weight 58.5 kg (128 lb 15.5 oz), SpO2 96 %.    Wt Readings from Last 3 Encounters:   10/06/23 58.5 kg (128 lb 15.5 oz)       Physical Exam  Eyes:      Extraocular Movements: Extraocular movements intact.      Conjunctiva/sclera: Conjunctivae normal.   Cardiovascular:      Rate and Rhythm: Normal rate and regular rhythm.   Pulmonary:      Effort: Pulmonary effort is normal.      Breath sounds: Normal breath sounds.   Abdominal:      General: Abdomen is flat.   Neurological:      Mental Status: She is alert. Mental status is at baseline.         Medications  Scheduled medications  azithromycin, 500 mg, intravenous, Daily  cefTRIAXone, 2 g, intravenous, q24h  enoxaparin, 40 mg, subcutaneous, Daily  melatonin, 3 mg, oral, Daily  oxybutynin, 5 mg, oral, BID  polyethylene glycol, 17 g, oral, Daily  pregabalin, 200 mg, oral, BID      Continuous medications  D5 % and 0.9 % sodium chloride, 75 mL/hr, Last Rate: 75 mL/hr (10/08/23 0555)      PRN medications  PRN medications: acetaminophen **OR** acetaminophen **OR** [DISCONTINUED] acetaminophen, meclizine, morphine, [DISCONTINUED] ondansetron ODT **OR** ondansetron    Labs  Results for orders placed or performed during the hospital encounter of 10/06/23 (from the past 24 hour(s))   CBC   Result Value Ref Range    WBC 7.5 4.4 - 11.3 x10*3/uL    nRBC 0.0 0.0 - 0.0 /100 WBCs    RBC 2.74 (L) 4.00 - 5.20 x10*6/uL    Hemoglobin 8.3 (L) 12.0 - 16.0 g/dL    Hematocrit 24.8 (L) 36.0 - 46.0 %    MCV 91 80 - 100 fL    MCH 30.3 26.0 - 34.0 pg    MCHC 33.5 32.0 - 36.0 g/dL    RDW 13.7 11.5 - 14.5 %    Platelets 144 (L) 150 - 450 x10*3/uL    MPV 8.9 7.5 - 11.5 fL   Hepatic Function Panel   Result Value Ref Range    Albumin 3.5 3.4 - 5.0 g/dL    Bilirubin, Total 0.5 0.0 - " 1.2 mg/dL    Bilirubin, Direct 0.1 0.0 - 0.3 mg/dL    Alkaline Phosphatase 58 33 - 110 U/L    ALT 20 7 - 45 U/L    AST 39 9 - 39 U/L    Total Protein 5.9 (L) 6.4 - 8.2 g/dL   Magnesium   Result Value Ref Range    Magnesium 1.74 1.60 - 2.40 mg/dL   Phosphorus   Result Value Ref Range    Phosphorus 1.9 (L) 2.5 - 4.9 mg/dL   Basic Metabolic Panel   Result Value Ref Range    Glucose 81 74 - 99 mg/dL    Sodium 138 136 - 145 mmol/L    Potassium 2.8 (LL) 3.5 - 5.3 mmol/L    Chloride 101 98 - 107 mmol/L    Bicarbonate 27 21 - 32 mmol/L    Anion Gap 13 10 - 20 mmol/L    Urea Nitrogen 14 6 - 23 mg/dL    Creatinine 0.36 (L) 0.50 - 1.05 mg/dL    eGFR >90 >60 mL/min/1.73m*2    Calcium 8.4 (L) 8.6 - 10.3 mg/dL   Renal function panel   Result Value Ref Range    Glucose 124 (H) 74 - 99 mg/dL    Sodium 138 136 - 145 mmol/L    Potassium 3.4 (L) 3.5 - 5.3 mmol/L    Chloride 103 98 - 107 mmol/L    Bicarbonate 28 21 - 32 mmol/L    Anion Gap 10 10 - 20 mmol/L    Urea Nitrogen 11 6 - 23 mg/dL    Creatinine 0.36 (L) 0.50 - 1.05 mg/dL    eGFR >90 >60 mL/min/1.73m*2    Calcium 8.4 (L) 8.6 - 10.3 mg/dL    Phosphorus 2.4 (L) 2.5 - 4.9 mg/dL    Albumin 3.4 3.4 - 5.0 g/dL   Magnesium   Result Value Ref Range    Magnesium 1.64 1.60 - 2.40 mg/dL   TSH   Result Value Ref Range    Thyroid Stimulating Hormone 2.07 0.44 - 3.98 mIU/L        Assessment/Plan  Pneumonia of right lower lobe due to infectious organism  - IV Rocephin 2g + 3 day course of IV Azithromycin  - White count resolved, sputum culture remains pending    Dysphagia  - Per ED & SNF report she is eating and drinking less  - PNA suspicious for aspiration  - SLP eval pending  - Keep NPO    Severe protein-calorie malnutrition (CMS/HCC)  - Dietitian consulted     Continue home medications for chronic conditions  Chronic Medical Conditions      GERD (gastroesophageal reflux disease) (Chronic)    Multiple sclerosis (CMS/HCC) (Chronic)    Anemia (Chronic)    Myofascial pain (Chronic)     Neuropathic pain (Chronic)    S/P insertion of intrathecal pump (Chronic)    Overactive bladder (Chronic)    Neurogenic bladder (Chronic)        Disposition: Patient can likely be discharged to her facility tomorrow after SLP & dietitian make recommendations    I spent 30 minutes in the professional and overall care of this patient.      Delano Paula, DO  Internal Medicine

## 2023-10-08 NOTE — PROGRESS NOTES
Shameka Zaragoza is a 53 y.o. female on day 2 of admission presenting with Pneumonia of right lower lobe due to infectious organism.      Subjective   Found in her room in no acute distress. Denies pain at the time of assessment. She is unsure of why she is here.        Objective   Physical Exam  HENT:      Mouth/Throat:      Mouth: Mucous membranes are moist.   Eyes:      Extraocular Movements: Extraocular movements intact.      Pupils: Pupils are equal, round, and reactive to light.   Cardiovascular:      Rate and Rhythm: Normal rate and regular rhythm.   Pulmonary:      Effort: Pulmonary effort is normal.      Breath sounds: Normal breath sounds.   Abdominal:      General: Bowel sounds are normal.      Palpations: Abdomen is soft.   Musculoskeletal:         General: Deformity present.      Cervical back: Neck supple.      Comments: Foot drop. Severe muscular weakness.   Skin:     General: Skin is warm and dry.      Capillary Refill: Capillary refill takes less than 2 seconds.      Comments: Toes bright pink   Neurological:      Mental Status: She is alert.      Comments: Verbalizations are delayed and with very slow speech.     Last Recorded Vitals  /69 (BP Location: Right arm, Patient Position: Lying)   Pulse 67   Temp 36.3 °C (97.4 °F) (Temporal)   Resp 16   Wt 58.5 kg (128 lb 15.5 oz)   SpO2 98%   Intake/Output last 3 Shifts:    Intake/Output Summary (Last 24 hours) at 10/8/2023 1039  Last data filed at 10/8/2023 1000  Gross per 24 hour   Intake 1806.25 ml   Output 1650 ml   Net 156.25 ml       Admission Weight  Weight: 59.1 kg (130 lb 4.7 oz) (10/06/23 1027)    Daily Weight  10/06/23 : 58.5 kg (128 lb 15.5 oz)  Scheduled medications  azithromycin, 500 mg, intravenous, Daily  cefTRIAXone, 2 g, intravenous, q24h  enoxaparin, 40 mg, subcutaneous, Daily  melatonin, 3 mg, oral, Daily  oxybutynin, 5 mg, oral, BID  polyethylene glycol, 17 g, oral, Daily  potassium phosphate, 21 mmol, intravenous,  Once  pregabalin, 200 mg, oral, BID      Continuous medications  D5 % and 0.9 % sodium chloride, 75 mL/hr, Last Rate: 75 mL/hr (10/08/23 0555)      PRN medications  PRN medications: acetaminophen **OR** acetaminophen **OR** [DISCONTINUED] acetaminophen, meclizine, morphine, [DISCONTINUED] ondansetron ODT **OR** ondansetron  Results for orders placed or performed during the hospital encounter of 10/06/23 (from the past 96 hour(s))   CBC and Auto Differential   Result Value Ref Range    WBC 15.5 (H) 4.4 - 11.3 x10*3/uL    nRBC 0.0 0.0 - 0.0 /100 WBCs    RBC 3.03 (L) 4.00 - 5.20 x10*6/uL    Hemoglobin 9.2 (L) 12.0 - 16.0 g/dL    Hematocrit 26.8 (L) 36.0 - 46.0 %    MCV 88 80 - 100 fL    MCH 30.4 26.0 - 34.0 pg    MCHC 34.3 32.0 - 36.0 g/dL    RDW 13.1 11.5 - 14.5 %    Platelets 165 150 - 450 x10*3/uL    MPV 9.5 7.5 - 11.5 fL    Neutrophils % 89.6 40.0 - 80.0 %    Immature Granulocytes %, Automated 0.3 0.0 - 0.9 %    Lymphocytes % 3.2 13.0 - 44.0 %    Monocytes % 6.8 2.0 - 10.0 %    Eosinophils % 0.0 0.0 - 6.0 %    Basophils % 0.1 0.0 - 2.0 %    Neutrophils Absolute 13.92 (H) 1.20 - 7.70 x10*3/uL    Immature Granulocytes Absolute, Automated 0.04 0.00 - 0.70 x10*3/uL    Lymphocytes Absolute 0.50 (L) 1.20 - 4.80 x10*3/uL    Monocytes Absolute 1.05 (H) 0.10 - 1.00 x10*3/uL    Eosinophils Absolute 0.00 0.00 - 0.70 x10*3/uL    Basophils Absolute 0.01 0.00 - 0.10 x10*3/uL   Comprehensive metabolic panel   Result Value Ref Range    Glucose 97 mg/dL    Sodium 123 mmol/L    Potassium 4.1 mmol/L    Chloride 84 mmol/L    Bicarbonate 28 mmol/L    Anion Gap 15 mmol/L    Urea Nitrogen 24 mg/dL    Creatinine 0.41 mg/dL    eGFR >90 >60 mL/min/1.73m*2    Calcium 8.8 mg/dL    Albumin 4.2 g/dL    Alkaline Phosphatase 76 U/L    Total Protein 7.0 g/dL    AST 44 U/L    Bilirubin, Total 0.8 0.0 - 1.2 mg/dL    ALT 23 U/L   Magnesium   Result Value Ref Range    Magnesium 1.59 mg/dL   Troponin I, High Sensitivity   Result Value Ref Range     Troponin I, High Sensitivity 16 ng/L   B-Type Natriuretic Peptide   Result Value Ref Range     pg/mL   Urinalysis with Reflex Microscopic and Culture   Result Value Ref Range    Color, Urine Yellow Straw, Yellow    Appearance, Urine Hazy (N) Clear    Specific Gravity, Urine 1.023 1.005 - 1.035    pH, Urine 5.0 5.0, 5.5, 6.0, 6.5, 7.0, 7.5, 8.0    Protein, Urine 30 (1+) (N) NEGATIVE mg/dL    Glucose, Urine NEGATIVE NEGATIVE mg/dL    Blood, Urine SMALL (1+) (A) NEGATIVE    Ketones, Urine 80 (2+) (A) NEGATIVE mg/dL    Bilirubin, Urine NEGATIVE NEGATIVE    Urobilinogen, Urine <2.0 <2.0 mg/dL    Nitrite, Urine NEGATIVE NEGATIVE    Leukocyte Esterase, Urine NEGATIVE NEGATIVE   Urinalysis Microscopic   Result Value Ref Range    WBC, Urine 11-20 (A) 1-5, NONE /HPF    RBC, Urine 1-2 NONE, 1-2, 3-5 /HPF    Squamous Epithelial Cells, Urine 1-9 (SPARSE) Reference range not established. /HPF    Renal Epithelial Cells, Urine 1-2 (FEW) Reference range not established. /HPF    Mucus, Urine 1+ Reference range not established. /LPF   Urine Culture    Specimen: Straight Catheter; Urine   Result Value Ref Range    Urine Culture No growth    SARS-CoV-2 RT PCR   Result Value Ref Range    Coronavirus 2019, PCR Not Detected Not Detected   CBC   Result Value Ref Range    WBC 7.5 4.4 - 11.3 x10*3/uL    nRBC 0.0 0.0 - 0.0 /100 WBCs    RBC 2.74 (L) 4.00 - 5.20 x10*6/uL    Hemoglobin 8.3 (L) 12.0 - 16.0 g/dL    Hematocrit 24.8 (L) 36.0 - 46.0 %    MCV 91 80 - 100 fL    MCH 30.3 26.0 - 34.0 pg    MCHC 33.5 32.0 - 36.0 g/dL    RDW 13.7 11.5 - 14.5 %    Platelets 144 (L) 150 - 450 x10*3/uL    MPV 8.9 7.5 - 11.5 fL   Hepatic Function Panel   Result Value Ref Range    Albumin 3.5 3.4 - 5.0 g/dL    Bilirubin, Total 0.5 0.0 - 1.2 mg/dL    Bilirubin, Direct 0.1 0.0 - 0.3 mg/dL    Alkaline Phosphatase 58 33 - 110 U/L    ALT 20 7 - 45 U/L    AST 39 9 - 39 U/L    Total Protein 5.9 (L) 6.4 - 8.2 g/dL   Magnesium   Result Value Ref Range    Magnesium  1.74 1.60 - 2.40 mg/dL   Phosphorus   Result Value Ref Range    Phosphorus 1.9 (L) 2.5 - 4.9 mg/dL   Basic Metabolic Panel   Result Value Ref Range    Glucose 81 74 - 99 mg/dL    Sodium 138 136 - 145 mmol/L    Potassium 2.8 (LL) 3.5 - 5.3 mmol/L    Chloride 101 98 - 107 mmol/L    Bicarbonate 27 21 - 32 mmol/L    Anion Gap 13 10 - 20 mmol/L    Urea Nitrogen 14 6 - 23 mg/dL    Creatinine 0.36 (L) 0.50 - 1.05 mg/dL    eGFR >90 >60 mL/min/1.73m*2    Calcium 8.4 (L) 8.6 - 10.3 mg/dL   Renal function panel   Result Value Ref Range    Glucose 124 (H) 74 - 99 mg/dL    Sodium 138 136 - 145 mmol/L    Potassium 3.4 (L) 3.5 - 5.3 mmol/L    Chloride 103 98 - 107 mmol/L    Bicarbonate 28 21 - 32 mmol/L    Anion Gap 10 10 - 20 mmol/L    Urea Nitrogen 11 6 - 23 mg/dL    Creatinine 0.36 (L) 0.50 - 1.05 mg/dL    eGFR >90 >60 mL/min/1.73m*2    Calcium 8.4 (L) 8.6 - 10.3 mg/dL    Phosphorus 2.4 (L) 2.5 - 4.9 mg/dL    Albumin 3.4 3.4 - 5.0 g/dL   Magnesium   Result Value Ref Range    Magnesium 1.64 1.60 - 2.40 mg/dL   TSH   Result Value Ref Range    Thyroid Stimulating Hormone 2.07 0.44 - 3.98 mIU/L   Hepatic Function Panel   Result Value Ref Range    Albumin 3.2 (L) 3.4 - 5.0 g/dL    Bilirubin, Total 0.5 0.0 - 1.2 mg/dL    Bilirubin, Direct 0.1 0.0 - 0.3 mg/dL    Alkaline Phosphatase 54 33 - 110 U/L    ALT 18 7 - 45 U/L    AST 23 9 - 39 U/L    Total Protein 5.7 (L) 6.4 - 8.2 g/dL   Magnesium   Result Value Ref Range    Magnesium 1.60 1.60 - 2.40 mg/dL   Phosphorus   Result Value Ref Range    Phosphorus 2.2 (L) 2.5 - 4.9 mg/dL   Basic Metabolic Panel   Result Value Ref Range    Glucose 106 (H) 74 - 99 mg/dL    Sodium 140 136 - 145 mmol/L    Potassium 3.2 (L) 3.5 - 5.3 mmol/L    Chloride 104 98 - 107 mmol/L    Bicarbonate 29 21 - 32 mmol/L    Anion Gap 10 10 - 20 mmol/L    Urea Nitrogen 9 6 - 23 mg/dL    Creatinine 0.31 (L) 0.50 - 1.05 mg/dL    eGFR >90 >60 mL/min/1.73m*2    Calcium 8.3 (L) 8.6 - 10.3 mg/dL   CBC   Result Value Ref Range     WBC 6.9 4.4 - 11.3 x10*3/uL    nRBC 0.0 0.0 - 0.0 /100 WBCs    RBC 2.97 (L) 4.00 - 5.20 x10*6/uL    Hemoglobin 8.9 (L) 12.0 - 16.0 g/dL    Hematocrit 27.3 (L) 36.0 - 46.0 %    MCV 92 80 - 100 fL    MCH 30.0 26.0 - 34.0 pg    MCHC 32.6 32.0 - 36.0 g/dL    RDW 13.8 11.5 - 14.5 %    Platelets 162 150 - 450 x10*3/uL    MPV 9.8 7.5 - 11.5 fL      Image Results  XR chest 1 view  Narrative: Interpreted By:  Mushtaq Squires,   STUDY:  XR CHEST 1 VIEW;  10/6/2023 11:37 am      INDICATION:  Signs/Symptoms:cough, altered mental status.      COMPARISON:  09/04/2023      ACCESSION NUMBER(S):  [id number]      ORDERING CLINICIAN:  JENA CHU      FINDINGS:  Patchy consolidation right mid to lower lung zone. The left lung  appears clear. No visualized pleural effusion. Unremarkable  cardiomediastinal silhouette. No pulmonary vascular congestion.      Impression: Right mid to lower lung pneumonia.          Signed by: Mushtaq Squires 10/6/2023 11:52 AM  Dictation workstation:   [id number]  CT head wo IV contrast  Narrative: Interpreted By:  Mushtaq Squires,   STUDY:  CT HEAD WO IV CONTRAST;  10/6/2023 11:30 am      INDICATION:  pain.      COMPARISON:  09/04/2023      ACCESSION NUMBER(S):  [id number]      ORDERING CLINICIAN:  JENA CHU      TECHNIQUE:  Contiguous axial images of the head obtained without contrast.      FINDINGS:  Slightly motion degraded exam.      INTRACRANIAL:  Focal white matter low-attenuation in the right frontal  periventricular region is nonspecific in unchanged compared to prior  exams. No acute intracranial hemorrhage or mass effect. No midline  shift. Patent basal cisterns. No abnormal extra axial fluid  collections. The grey-white differentiation is preserved. The  calvaria appears intact.      EXTRACRANIAL:  Visualized paranasal sinuses and mastoids are clear.      Impression: No acute intracranial pathology identified.              Signed by: Mushtaq Squires 10/6/2023 11:50 AM  Dictation workstation:    [id number]        Assessment/Plan   Pneumonia of right lower lobe due to infectious organism  - IV Rocephin 2g + 3 day course of IV Azithromycin  - White count resolved, sputum culture remains pending     Dysphagia  - Per ED & SNF report she is eating and drinking less  - PNA suspicious for aspiration  - SLP eval pending  - Keep NPO     Severe protein-calorie malnutrition (CMS/HCC)  - Dietitian consulted      Continue home medications for chronic conditions  Chronic Medical Conditions        GERD (gastroesophageal reflux disease) (Chronic)     Multiple sclerosis (CMS/HCC) (Chronic)     Anemia (Chronic)     Myofascial pain (Chronic)     Neuropathic pain (Chronic)     S/P insertion of intrathecal pump (Chronic)     Overactive bladder (Chronic)     Neurogenic bladder (Chronic)         Disposition: Patient can likely be discharged to her facility tomorrow after SLP & dietitian make recommendations     RAIMUNDO Ledesma    Patient was seen in collaboration with the JUAN C, Melinda EUBANKS.  I was present for the pertinent components of the history, physical, and medical decision making and independently examined the patient.  We reviewed the medication reconciliation list and ancillary studies, including lab, imaging, and microbiology, as well as discussed the differential diagnoses; which includes, but is not limited to.    I agree with her plan as documented in the electronic medical record.    Delano Paula,

## 2023-10-09 LAB
ALBUMIN SERPL BCP-MCNC: 3.1 G/DL (ref 3.4–5)
ALP SERPL-CCNC: 59 U/L (ref 33–110)
ALT SERPL W P-5'-P-CCNC: 15 U/L (ref 7–45)
ANION GAP SERPL CALC-SCNC: 9 MMOL/L (ref 10–20)
AST SERPL W P-5'-P-CCNC: 14 U/L (ref 9–39)
BILIRUB DIRECT SERPL-MCNC: 0.1 MG/DL (ref 0–0.3)
BILIRUB SERPL-MCNC: 0.5 MG/DL (ref 0–1.2)
BUN SERPL-MCNC: 6 MG/DL (ref 6–23)
CALCIUM SERPL-MCNC: 7.9 MG/DL (ref 8.6–10.3)
CHLORIDE SERPL-SCNC: 106 MMOL/L (ref 98–107)
CO2 SERPL-SCNC: 31 MMOL/L (ref 21–32)
CREAT SERPL-MCNC: 0.27 MG/DL (ref 0.5–1.05)
ERYTHROCYTE [DISTWIDTH] IN BLOOD BY AUTOMATED COUNT: 13.9 % (ref 11.5–14.5)
GFR SERPL CREATININE-BSD FRML MDRD: >90 ML/MIN/1.73M*2
GLUCOSE SERPL-MCNC: 99 MG/DL (ref 74–99)
HCT VFR BLD AUTO: 26.6 % (ref 36–46)
HGB BLD-MCNC: 8.6 G/DL (ref 12–16)
MAGNESIUM SERPL-MCNC: 1.59 MG/DL (ref 1.6–2.4)
MCH RBC QN AUTO: 30.1 PG (ref 26–34)
MCHC RBC AUTO-ENTMCNC: 32.3 G/DL (ref 32–36)
MCV RBC AUTO: 93 FL (ref 80–100)
NRBC BLD-RTO: 0 /100 WBCS (ref 0–0)
PHOSPHATE SERPL-MCNC: 3.4 MG/DL (ref 2.5–4.9)
PLATELET # BLD AUTO: 212 X10*3/UL (ref 150–450)
PMV BLD AUTO: 9.3 FL (ref 7.5–11.5)
POTASSIUM SERPL-SCNC: 3.1 MMOL/L (ref 3.5–5.3)
PROT SERPL-MCNC: 5.6 G/DL (ref 6.4–8.2)
RBC # BLD AUTO: 2.86 X10*6/UL (ref 4–5.2)
SODIUM SERPL-SCNC: 143 MMOL/L (ref 136–145)
WBC # BLD AUTO: 6 X10*3/UL (ref 4.4–11.3)

## 2023-10-09 PROCEDURE — 96372 THER/PROPH/DIAG INJ SC/IM: CPT | Mod: IPSPLIT | Performed by: STUDENT IN AN ORGANIZED HEALTH CARE EDUCATION/TRAINING PROGRAM

## 2023-10-09 PROCEDURE — 36415 COLL VENOUS BLD VENIPUNCTURE: CPT | Mod: IPSPLIT | Performed by: STUDENT IN AN ORGANIZED HEALTH CARE EDUCATION/TRAINING PROGRAM

## 2023-10-09 PROCEDURE — 94760 N-INVAS EAR/PLS OXIMETRY 1: CPT | Mod: IPSPLIT

## 2023-10-09 PROCEDURE — 84100 ASSAY OF PHOSPHORUS: CPT | Mod: IPSPLIT | Performed by: STUDENT IN AN ORGANIZED HEALTH CARE EDUCATION/TRAINING PROGRAM

## 2023-10-09 PROCEDURE — 82248 BILIRUBIN DIRECT: CPT | Mod: IPSPLIT | Performed by: STUDENT IN AN ORGANIZED HEALTH CARE EDUCATION/TRAINING PROGRAM

## 2023-10-09 PROCEDURE — 1100000001 HC PRIVATE ROOM DAILY: Mod: IPSPLIT

## 2023-10-09 PROCEDURE — 2500000004 HC RX 250 GENERAL PHARMACY W/ HCPCS (ALT 636 FOR OP/ED): Mod: IPSPLIT | Performed by: NURSE PRACTITIONER

## 2023-10-09 PROCEDURE — 85027 COMPLETE CBC AUTOMATED: CPT | Mod: IPSPLIT | Performed by: STUDENT IN AN ORGANIZED HEALTH CARE EDUCATION/TRAINING PROGRAM

## 2023-10-09 PROCEDURE — 99232 SBSQ HOSP IP/OBS MODERATE 35: CPT | Performed by: STUDENT IN AN ORGANIZED HEALTH CARE EDUCATION/TRAINING PROGRAM

## 2023-10-09 PROCEDURE — 2500000004 HC RX 250 GENERAL PHARMACY W/ HCPCS (ALT 636 FOR OP/ED): Mod: IPSPLIT | Performed by: STUDENT IN AN ORGANIZED HEALTH CARE EDUCATION/TRAINING PROGRAM

## 2023-10-09 PROCEDURE — 80053 COMPREHEN METABOLIC PANEL: CPT | Mod: IPSPLIT | Performed by: STUDENT IN AN ORGANIZED HEALTH CARE EDUCATION/TRAINING PROGRAM

## 2023-10-09 PROCEDURE — 92610 EVALUATE SWALLOWING FUNCTION: CPT | Mod: GN,IPSPLIT

## 2023-10-09 PROCEDURE — 83735 ASSAY OF MAGNESIUM: CPT | Mod: IPSPLIT | Performed by: STUDENT IN AN ORGANIZED HEALTH CARE EDUCATION/TRAINING PROGRAM

## 2023-10-09 PROCEDURE — 2500000001 HC RX 250 WO HCPCS SELF ADMINISTERED DRUGS (ALT 637 FOR MEDICARE OP): Mod: IPSPLIT | Performed by: STUDENT IN AN ORGANIZED HEALTH CARE EDUCATION/TRAINING PROGRAM

## 2023-10-09 RX ORDER — MAGNESIUM SULFATE HEPTAHYDRATE 40 MG/ML
2 INJECTION, SOLUTION INTRAVENOUS ONCE
Status: COMPLETED | OUTPATIENT
Start: 2023-10-09 | End: 2023-10-09

## 2023-10-09 RX ORDER — POTASSIUM CHLORIDE 14.9 MG/ML
20 INJECTION INTRAVENOUS
Status: COMPLETED | OUTPATIENT
Start: 2023-10-09 | End: 2023-10-09

## 2023-10-09 RX ADMIN — OXYBUTYNIN CHLORIDE 5 MG: 5 TABLET ORAL at 08:27

## 2023-10-09 RX ADMIN — ENOXAPARIN SODIUM 40 MG: 100 INJECTION SUBCUTANEOUS at 08:27

## 2023-10-09 RX ADMIN — MAGNESIUM SULFATE HEPTAHYDRATE 2 G: 40 INJECTION, SOLUTION INTRAVENOUS at 13:58

## 2023-10-09 RX ADMIN — PREGABALIN 200 MG: 100 CAPSULE ORAL at 21:13

## 2023-10-09 RX ADMIN — OXYBUTYNIN CHLORIDE 5 MG: 5 TABLET ORAL at 21:12

## 2023-10-09 RX ADMIN — POTASSIUM CHLORIDE 20 MEQ: 14.9 INJECTION, SOLUTION INTRAVENOUS at 16:09

## 2023-10-09 RX ADMIN — AZITHROMYCIN 500 MG: 500 INJECTION, POWDER, LYOPHILIZED, FOR SOLUTION INTRAVENOUS at 13:50

## 2023-10-09 RX ADMIN — MELATONIN TAB 3 MG 3 MG: 3 TAB at 21:12

## 2023-10-09 RX ADMIN — POTASSIUM CHLORIDE 20 MEQ: 14.9 INJECTION, SOLUTION INTRAVENOUS at 12:14

## 2023-10-09 RX ADMIN — CEFTRIAXONE 2 G: 2 INJECTION, SOLUTION INTRAVENOUS at 10:27

## 2023-10-09 RX ADMIN — DEXTROSE AND SODIUM CHLORIDE 75 ML/HR: 5; 900 INJECTION, SOLUTION INTRAVENOUS at 10:26

## 2023-10-09 RX ADMIN — PREGABALIN 200 MG: 100 CAPSULE ORAL at 08:27

## 2023-10-09 ASSESSMENT — COGNITIVE AND FUNCTIONAL STATUS - GENERAL
MOBILITY SCORE: 6
MOBILITY SCORE: 6
WALKING IN HOSPITAL ROOM: TOTAL
DRESSING REGULAR UPPER BODY CLOTHING: TOTAL
PERSONAL GROOMING: TOTAL
STANDING UP FROM CHAIR USING ARMS: TOTAL
TOILETING: TOTAL
CLIMB 3 TO 5 STEPS WITH RAILING: TOTAL
CLIMB 3 TO 5 STEPS WITH RAILING: TOTAL
MOVING FROM LYING ON BACK TO SITTING ON SIDE OF FLAT BED WITH BEDRAILS: TOTAL
TURNING FROM BACK TO SIDE WHILE IN FLAT BAD: TOTAL
PERSONAL GROOMING: TOTAL
MOVING TO AND FROM BED TO CHAIR: TOTAL
EATING MEALS: A LOT
DAILY ACTIVITIY SCORE: 7
MOVING FROM LYING ON BACK TO SITTING ON SIDE OF FLAT BED WITH BEDRAILS: TOTAL
HELP NEEDED FOR BATHING: TOTAL
DAILY ACTIVITIY SCORE: 7
MOVING TO AND FROM BED TO CHAIR: TOTAL
DRESSING REGULAR LOWER BODY CLOTHING: TOTAL
DRESSING REGULAR LOWER BODY CLOTHING: TOTAL
DRESSING REGULAR UPPER BODY CLOTHING: TOTAL
STANDING UP FROM CHAIR USING ARMS: TOTAL
TOILETING: TOTAL
HELP NEEDED FOR BATHING: TOTAL
WALKING IN HOSPITAL ROOM: TOTAL
EATING MEALS: A LOT
TURNING FROM BACK TO SIDE WHILE IN FLAT BAD: TOTAL

## 2023-10-09 ASSESSMENT — PAIN SCALES - GENERAL
PAINLEVEL_OUTOF10: 0 - NO PAIN
PAINLEVEL_OUTOF10: 0 - NO PAIN
PAINLEVEL_OUTOF10: 3

## 2023-10-09 ASSESSMENT — PAIN - FUNCTIONAL ASSESSMENT
PAIN_FUNCTIONAL_ASSESSMENT: 0-10

## 2023-10-09 NOTE — CONSULTS
"Nutrition Assessment Note  Assessment    Assessment:  Reason for Assessment  Reason for Assessment: Provider consult order    HPI:  Pt with PMHx significant for Multiple Sclerosis was brought to [facility] ED for concern for change in mentation and decreased oral intake.     Pt visited in room,  at bedside. Pt alert and orientated.  states pt was not feeling well and had little to eat for the past week. Per pt and , pt typically eats slowly, but no recent hx of difficulty chewing or swallowing. Pt reports eating and tolerating lunch well today. Per pt and , pt has adequate protein intake: Greek yogurt, steak, chicken, eggs. Per , pt currently with no skin breakdown. Pt uses a power chair and uses gel cushions when sitting and a sleep number bed at night to prevent skin breakdown. Pt's  lbs. No nutrition concerns at this time.         History:  Food and Nutrient History  Energy Intake: Fair 50-75 %  Food and Nutrient History: Eats 3 meals per day, eats slowly, no recent hx of difficulty chewing or swallowing, had good intake prior to recent illness.       Anthropometrics:  Height: 165.1 cm (5' 5\")  Weight: 58.5 kg (128 lb 15.5 oz)  BMI (Calculated): 21.46      Weight Change  Weight History / % Weight Change: 06/30/23 - 54.2 kg; 03/17/22 - 52.5 kg  Significant Weight Loss: No         IBW/kg (Dietitian Calculated): 56.8 kg  Percent of IBW: 103 %       Energy Needs:  Calculated Energy Needs Using Equations  Height: 165.1 cm (5' 5\")    Estimated Energy Needs  Total Energy Estimated Needs (kCal): 1500 kCal  Method for Estimating Needs: 25 Kcal/kg    Estimated Protein Needs  Total Protein Estimated Needs (g): 47 g  Method for Estimating Needs: 0.8 gm/kg    Estimated Fluid Needs  Total Fluid Estimated Needs (mL): 1800 mL  Method for Estimating Needs: 30 mL/kg         Nutrition Focused Physical Findings: Visual assessment only  Subcutaneous Fat Loss  Orbital Fat Pads: Mild-Moderate " (slight dark circles and slight hollowing)  Buccal Fat Pads: Well nourished (full, rounded cheeks)    Muscle Wasting  Temporalis: Well nourished (well-defined muscle)  Pectoralis (Clavicular Region): Mild-Moderate (some protrusion of clavicle)  Interosseous: Defer  Trapezius/Infraspinatus/Supraspinatus (Scapular Region): Defer  Quadriceps: Defer  Gastrocnemius: Defer  Pt is non-ambulatory so muscle atrophy present       Physical Findings (Nutrition Deficiency/Toxicity)  Hair: Negative  Eyes: Negative  Skin: Negative (Per  - intact / WDL)    Diagnosis   Diagnosis:       Patient has Nutrition Diagnosis: No (Pt with intake difficulty upon admission 2/2 infection. Currently experiencing no intake difficulties. SLP eval pending. Per pt no hx of wt loss. Tolerating current diet order of Easy to Chew foods with thin liquids.)          Interventions/Recommendations   Interventions/Recommendations:  Nutrition Prescription  Individualized Nutrition Prescription Provided for : Diet per SLP recommendations; No nutrition risk identified at this time         Coordination of Nutrition Care by a Nutrition Professional  Collaboration and Referral of Nutrition Care: Team meeting involving nutrition professional      Monitoring and Evaluation   Monitoring/Evaluation:  Food and Nutrient Related History  Pt will tolerate consistency per SLP recommendations  Criteria: Pt will eat >75% of meals     Anthropometrics: Body Composition/Growth/Weight History  Criteria: Pt will maintain weight          Follow Up  Time Spent (min): 60 minutes  Last Date of Nutrition Visit: 10/09/23  Nutrition Follow-Up Needed?: Dietitian to reassess per policy  Follow up Comment: No risk identified

## 2023-10-09 NOTE — CARE PLAN
The patient's goals for the shift include      The clinical goals for the shift include Pt will tolerate IV K, Mag Sulf, and ATB through 1900    Over the shift, the patient tolerated all IV meds. Patient is more alert this shift and eating meals.  at bedside, call light in reach.

## 2023-10-09 NOTE — PROGRESS NOTES
10/09/23 1613   Discharge Planning   Living Arrangements Spouse/significant other   Support Systems Spouse/significant other;Other (Comment)  (private pay aides)   Assistance Needed 24 hr assist with ADLS   Type of Residence Private residence   Patient expects to be discharged to: home     Pt lives with her spouse and has advanced M.S. Spouse has hired private pay aides to help provide 24hr care for her at home. Pt is being treated for aspiration pneumonia and is awaiting a speech eval. Pt/sp expects pt to return home when medically stable.   Nicci Kaba LCSW

## 2023-10-09 NOTE — PROGRESS NOTES
Speech-Language Pathology    Inpatient Speech-Language Pathology Clinical Swallow Evaluation    Patient Name: Shameka Zaragoza  MRN: [id number]  Today's Date: 10/9/2023   Time Calculation  Start Time: 1445  Stop Time: 1510  Time Calculation (min): 25 min         Current Problem:   Patient Active Problem List   Diagnosis    Pneumonia of right lower lobe due to infectious organism    Multiple sclerosis (CMS/HCC)    Anemia    Severe protein-calorie malnutrition (CMS/HCC)    Nonverbal    GERD (gastroesophageal reflux disease)    Neurogenic bladder    Myofascial pain    Overactive bladder    Neuropathic pain    S/P insertion of intrathecal pump    Dysphagia         Recommendations:  Risk for Aspiration: No  Additional Recommendations: Dysphagia treatment  Solid Diet Recommendations : Soft & bite sized/chopped (IDDSI Level 6)  Liquid Diet Recommendations: Thin (IDDSI Level 0)  Compensatory Swallowing Strategies: Upright 90 degrees as possible for all oral intake, Remain upright for 20-30 minutes after meals, Small bites/sips, Eat/feed slowly  Medication Administration Recommendations: Whole, With Liquid, With Pureed  Follow up treatments: Diet tolerance monitoring, Patient/family education      Assessment:  Assessment Results: Pt presents with functional swallow with no suspected pharyngeal dysphagia upon completion of CSE. However, silent aspiration cannot be ruled out at the bedside. Given that there is no suspected laryngeal dysfunction and/or airway compromise, pt is at a low risk of developing future pulmonary complications associated with aspiration given the identified risk factors and prognostic factors. Pt does complain of globus sensation following regular solids, suspect pt complaints are esophageal in origin.   Prognosis: Good  Barriers to Discharge: none  Treatment Provided: No  Medical Staff Made Aware: Yes  Strengths: Family/Caregiver Suppport, Motivation  Barriers: Comorbidities      Plan:  Inpatient/Swing  Bed or Outpatient: Inpatient  Treatment/Interventions: Assess diet tolerance, Patient/family education  SLP Plan: Skilled SLP  SLP Frequency: Follow-up visit only  Duration: 1 week  SLP Discharge Recommendations: Home with no further SLP  Diet Recommendations: Solid, Liquid  Solid Consistency: Soft & bite sized/chopped (IDDSI Level 6)  Liquid Consistency: Thin (IDDSI Level 0)  Next Treatment Priority: Assess diet tolerance/endurance  Discussed POC: Patient, Caregiver/family  Discussed Risks/Benefits: Yes  Patient/Caregiver Agreeable: Yes  SLP - OK to Discharge: Yes    Goals:   Pt will maintain adequate hydration/nutrition with optimal safety and efficiency via PO intake on least restrictive diet without evidence of pulmonary compromise.   Pt will demonstrate understanding/carryover of compensatory safe swallow strategies (slow rate, small bites/sips, alternating consistencies) in 90% of opportunities with min verbal cues.     Subjective   Current Problem:  Pt's spouse present during evaluation; assisted with providing history as needed. Spouse reporting that pt is on a baseline diet of IDDSI 7/regular solids and IDDSI 0/thin liquids with no issues. Per spouse, pt's appetite significantly reduced, stopped eating altogether on 10/3/23. However pt and spouse both reporting that appetite is improved this date.       General Visit Information:  Patient Class: Inpatient  Living Environment: Home, Live with   Ordering Physician: Delano Paula  Reason for Referral: suspected oral/pharyngeal dysphagia  Referred By: Delano Paula  Past Medical History Relevant to Rehab: Multiple Sclerosis, Neurogenic Bladder w/ chronic johns,  HTN, GERD , Anemia, and Neuropathic Pain  Prior Level of Function: WFL  Patient Seen During This Visit: Yes  Date of Onset: 10/06/23  Date of Order: 10/07/23  [employer] Diet: regular solids/thin liquids  Current Diet : soft and bite-sized solids and thin liquids  Dysphagia Diagnosis: Within  functional limits, Other (Comment)      Objective       Baseline Assessment:  Temperature Spikes Noted: No  Respiratory Status: Room air; SPO2 100%; RR 18  Labs: K low; Cr low; Albumin low (concern for malnutrition; weakness; and confusion)  Behavior/Cognition: Alert, Cooperative, Pleasant mood  Patient Positioning: Upright in Bed  Baseline Vocal Quality: Normal      Pain:  Pain Assessment: 0-10  Pain Score: 0 - No pain       Oral/Motor Assessment:  Oral Hygiene: natural dentition; pink/moist mucosa; adequate oral health/hygiene  Dentition: Adequate/Natural  Oral Motor: Within Functional Limits      Consistencies Trialed:  Consistencies Trialed: Yes  Consistencies Trialed: Thin (IDDSI Level 0) - Straw, Pureed/extremely thick (IDDSI Level 4), Soft & bite sized/chopped (IDDSI Level 6), Regular (IDDSI Level 7)      Clinical Observations:  Patient Positioning: Upright in Bed  Management of Oral Secretions: Adequate  Was The 3 oz Swallow Protocol Completed: Yes  Signs/Symptoms of Aspiration: Other (Comment)  Changes in VS Noted: Other (Comment)  Other Signs/Symptoms of Difficulty with Feeding: Reported Globus Sensation  Clinical Observation Comment: No suspected laryngeal dysfunction or airway compromise. Swallow functional for age. However, pt reporting discomfort and globus sensation following regular solids; gesturing to sternum. Required 2 large sips of water to reduce discomfort. Suspected discomfort is related to esophageal dysfunction          Education:  Learner patient; spouse   Barriers to Learning none   Method Verbal    Education - Topic ST provided patient education regarding role of ST, purpose of assessment, clinical impressions, goals of treatment, and plan of care. Patient verbalized questionable full comprehension, consistent with cognitive status. Education will be reinforced. ST further coordinated with RN regarding recommendations and precautions per this assessment, with RN verbalizing  understanding.     Outcome    Verbalized understanding and agreement;           Consultations/Referrals/Coordination of Services: Pt would benefit from GI consult if globus sensation continues to assess esophageal motility.

## 2023-10-09 NOTE — PROGRESS NOTES
Shameka Zaragoza is a 53 y.o. female on day 3 of admission presenting with Pneumonia of right lower lobe due to infectious organism.      Subjective   Denies pain. Improved cognition noted. Continues to feel weak. Denies fever or chills.        Objective   Physical Exam  HENT:      Mouth/Throat:      Mouth: Mucous membranes are moist.   Eyes:      Extraocular Movements: Extraocular movements intact.      Pupils: Pupils are equal, round, and reactive to light.   Cardiovascular:      Rate and Rhythm: Normal rate and regular rhythm.   Pulmonary:      Effort: Pulmonary effort is normal.      Breath sounds: Normal breath sounds.   Abdominal:      General: Bowel sounds are normal.      Palpations: Abdomen is soft.   Musculoskeletal:         General: Deformity present.      Cervical back: Neck supple.      Comments: Foot drop. Severe muscular weakness.   Skin:     General: Skin is warm and dry.      Capillary Refill: Capillary refill takes less than 2 seconds.      Comments: Toes bright pink   Neurological:      Mental Status: She is alert.      Comments: Improved cognition and increased animation.   Last Recorded Vitals  BP 88/53 (BP Location: Right arm, Patient Position: Lying)   Pulse 80   Temp 36.4 °C (97.6 °F) (Temporal)   Resp 18   Wt 58.5 kg (128 lb 15.5 oz)   SpO2 100%   Intake/Output last 3 Shifts:    Intake/Output Summary (Last 24 hours) at 10/9/2023 1602  Last data filed at 10/9/2023 1339  Gross per 24 hour   Intake 993.75 ml   Output 1250 ml   Net -256.25 ml       Admission Weight  Weight: 59.1 kg (130 lb 4.7 oz) (10/06/23 1027)    Daily Weight  10/09/23 : 58.5 kg (128 lb 15.5 oz)  Scheduled medications  azithromycin, 500 mg, intravenous, Daily  enoxaparin, 40 mg, subcutaneous, Daily  melatonin, 3 mg, oral, Daily  oxybutynin, 5 mg, oral, BID  polyethylene glycol, 17 g, oral, Daily  potassium chloride, 20 mEq, intravenous, q2h  pregabalin, 200 mg, oral, BID      Continuous medications  D5 % and 0.9 %  sodium chloride, 75 mL/hr, Last Rate: 75 mL/hr (10/09/23 1026)      PRN medications  PRN medications: acetaminophen **OR** acetaminophen **OR** [DISCONTINUED] acetaminophen, meclizine, morphine, [DISCONTINUED] ondansetron ODT **OR** ondansetron   Results for orders placed or performed during the hospital encounter of 10/06/23 (from the past 96 hour(s))   CBC and Auto Differential   Result Value Ref Range    WBC 15.5 (H) 4.4 - 11.3 x10*3/uL    nRBC 0.0 0.0 - 0.0 /100 WBCs    RBC 3.03 (L) 4.00 - 5.20 x10*6/uL    Hemoglobin 9.2 (L) 12.0 - 16.0 g/dL    Hematocrit 26.8 (L) 36.0 - 46.0 %    MCV 88 80 - 100 fL    MCH 30.4 26.0 - 34.0 pg    MCHC 34.3 32.0 - 36.0 g/dL    RDW 13.1 11.5 - 14.5 %    Platelets 165 150 - 450 x10*3/uL    MPV 9.5 7.5 - 11.5 fL    Neutrophils % 89.6 40.0 - 80.0 %    Immature Granulocytes %, Automated 0.3 0.0 - 0.9 %    Lymphocytes % 3.2 13.0 - 44.0 %    Monocytes % 6.8 2.0 - 10.0 %    Eosinophils % 0.0 0.0 - 6.0 %    Basophils % 0.1 0.0 - 2.0 %    Neutrophils Absolute 13.92 (H) 1.20 - 7.70 x10*3/uL    Immature Granulocytes Absolute, Automated 0.04 0.00 - 0.70 x10*3/uL    Lymphocytes Absolute 0.50 (L) 1.20 - 4.80 x10*3/uL    Monocytes Absolute 1.05 (H) 0.10 - 1.00 x10*3/uL    Eosinophils Absolute 0.00 0.00 - 0.70 x10*3/uL    Basophils Absolute 0.01 0.00 - 0.10 x10*3/uL   Comprehensive metabolic panel   Result Value Ref Range    Glucose 97 mg/dL    Sodium 123 mmol/L    Potassium 4.1 mmol/L    Chloride 84 mmol/L    Bicarbonate 28 mmol/L    Anion Gap 15 mmol/L    Urea Nitrogen 24 mg/dL    Creatinine 0.41 mg/dL    eGFR >90 >60 mL/min/1.73m*2    Calcium 8.8 mg/dL    Albumin 4.2 g/dL    Alkaline Phosphatase 76 U/L    Total Protein 7.0 g/dL    AST 44 U/L    Bilirubin, Total 0.8 0.0 - 1.2 mg/dL    ALT 23 U/L   Magnesium   Result Value Ref Range    Magnesium 1.59 mg/dL   Troponin I, High Sensitivity   Result Value Ref Range    Troponin I, High Sensitivity 16 ng/L   B-Type Natriuretic Peptide   Result Value  Ref Range     pg/mL   Urinalysis with Reflex Microscopic and Culture   Result Value Ref Range    Color, Urine Yellow Straw, Yellow    Appearance, Urine Hazy (N) Clear    Specific Gravity, Urine 1.023 1.005 - 1.035    pH, Urine 5.0 5.0, 5.5, 6.0, 6.5, 7.0, 7.5, 8.0    Protein, Urine 30 (1+) (N) NEGATIVE mg/dL    Glucose, Urine NEGATIVE NEGATIVE mg/dL    Blood, Urine SMALL (1+) (A) NEGATIVE    Ketones, Urine 80 (2+) (A) NEGATIVE mg/dL    Bilirubin, Urine NEGATIVE NEGATIVE    Urobilinogen, Urine <2.0 <2.0 mg/dL    Nitrite, Urine NEGATIVE NEGATIVE    Leukocyte Esterase, Urine NEGATIVE NEGATIVE   Urinalysis Microscopic   Result Value Ref Range    WBC, Urine 11-20 (A) 1-5, NONE /HPF    RBC, Urine 1-2 NONE, 1-2, 3-5 /HPF    Squamous Epithelial Cells, Urine 1-9 (SPARSE) Reference range not established. /HPF    Renal Epithelial Cells, Urine 1-2 (FEW) Reference range not established. /HPF    Mucus, Urine 1+ Reference range not established. /LPF   Urine Culture    Specimen: Straight Catheter; Urine   Result Value Ref Range    Urine Culture No growth    SARS-CoV-2 RT PCR   Result Value Ref Range    Coronavirus 2019, PCR Not Detected Not Detected   CBC   Result Value Ref Range    WBC 7.5 4.4 - 11.3 x10*3/uL    nRBC 0.0 0.0 - 0.0 /100 WBCs    RBC 2.74 (L) 4.00 - 5.20 x10*6/uL    Hemoglobin 8.3 (L) 12.0 - 16.0 g/dL    Hematocrit 24.8 (L) 36.0 - 46.0 %    MCV 91 80 - 100 fL    MCH 30.3 26.0 - 34.0 pg    MCHC 33.5 32.0 - 36.0 g/dL    RDW 13.7 11.5 - 14.5 %    Platelets 144 (L) 150 - 450 x10*3/uL    MPV 8.9 7.5 - 11.5 fL   Hepatic Function Panel   Result Value Ref Range    Albumin 3.5 3.4 - 5.0 g/dL    Bilirubin, Total 0.5 0.0 - 1.2 mg/dL    Bilirubin, Direct 0.1 0.0 - 0.3 mg/dL    Alkaline Phosphatase 58 33 - 110 U/L    ALT 20 7 - 45 U/L    AST 39 9 - 39 U/L    Total Protein 5.9 (L) 6.4 - 8.2 g/dL   Magnesium   Result Value Ref Range    Magnesium 1.74 1.60 - 2.40 mg/dL   Phosphorus   Result Value Ref Range    Phosphorus 1.9  (L) 2.5 - 4.9 mg/dL   Basic Metabolic Panel   Result Value Ref Range    Glucose 81 74 - 99 mg/dL    Sodium 138 136 - 145 mmol/L    Potassium 2.8 (LL) 3.5 - 5.3 mmol/L    Chloride 101 98 - 107 mmol/L    Bicarbonate 27 21 - 32 mmol/L    Anion Gap 13 10 - 20 mmol/L    Urea Nitrogen 14 6 - 23 mg/dL    Creatinine 0.36 (L) 0.50 - 1.05 mg/dL    eGFR >90 >60 mL/min/1.73m*2    Calcium 8.4 (L) 8.6 - 10.3 mg/dL   Renal function panel   Result Value Ref Range    Glucose 124 (H) 74 - 99 mg/dL    Sodium 138 136 - 145 mmol/L    Potassium 3.4 (L) 3.5 - 5.3 mmol/L    Chloride 103 98 - 107 mmol/L    Bicarbonate 28 21 - 32 mmol/L    Anion Gap 10 10 - 20 mmol/L    Urea Nitrogen 11 6 - 23 mg/dL    Creatinine 0.36 (L) 0.50 - 1.05 mg/dL    eGFR >90 >60 mL/min/1.73m*2    Calcium 8.4 (L) 8.6 - 10.3 mg/dL    Phosphorus 2.4 (L) 2.5 - 4.9 mg/dL    Albumin 3.4 3.4 - 5.0 g/dL   Magnesium   Result Value Ref Range    Magnesium 1.64 1.60 - 2.40 mg/dL   TSH   Result Value Ref Range    Thyroid Stimulating Hormone 2.07 0.44 - 3.98 mIU/L   Hepatic Function Panel   Result Value Ref Range    Albumin 3.2 (L) 3.4 - 5.0 g/dL    Bilirubin, Total 0.5 0.0 - 1.2 mg/dL    Bilirubin, Direct 0.1 0.0 - 0.3 mg/dL    Alkaline Phosphatase 54 33 - 110 U/L    ALT 18 7 - 45 U/L    AST 23 9 - 39 U/L    Total Protein 5.7 (L) 6.4 - 8.2 g/dL   Magnesium   Result Value Ref Range    Magnesium 1.60 1.60 - 2.40 mg/dL   Phosphorus   Result Value Ref Range    Phosphorus 2.2 (L) 2.5 - 4.9 mg/dL   Basic Metabolic Panel   Result Value Ref Range    Glucose 106 (H) 74 - 99 mg/dL    Sodium 140 136 - 145 mmol/L    Potassium 3.2 (L) 3.5 - 5.3 mmol/L    Chloride 104 98 - 107 mmol/L    Bicarbonate 29 21 - 32 mmol/L    Anion Gap 10 10 - 20 mmol/L    Urea Nitrogen 9 6 - 23 mg/dL    Creatinine 0.31 (L) 0.50 - 1.05 mg/dL    eGFR >90 >60 mL/min/1.73m*2    Calcium 8.3 (L) 8.6 - 10.3 mg/dL   CBC   Result Value Ref Range    WBC 6.9 4.4 - 11.3 x10*3/uL    nRBC 0.0 0.0 - 0.0 /100 WBCs    RBC 2.97 (L)  4.00 - 5.20 x10*6/uL    Hemoglobin 8.9 (L) 12.0 - 16.0 g/dL    Hematocrit 27.3 (L) 36.0 - 46.0 %    MCV 92 80 - 100 fL    MCH 30.0 26.0 - 34.0 pg    MCHC 32.6 32.0 - 36.0 g/dL    RDW 13.8 11.5 - 14.5 %    Platelets 162 150 - 450 x10*3/uL    MPV 9.8 7.5 - 11.5 fL   Hepatic Function Panel   Result Value Ref Range    Albumin 3.1 (L) 3.4 - 5.0 g/dL    Bilirubin, Total 0.5 0.0 - 1.2 mg/dL    Bilirubin, Direct 0.1 0.0 - 0.3 mg/dL    Alkaline Phosphatase 59 33 - 110 U/L    ALT 15 7 - 45 U/L    AST 14 9 - 39 U/L    Total Protein 5.6 (L) 6.4 - 8.2 g/dL   Magnesium   Result Value Ref Range    Magnesium 1.59 (L) 1.60 - 2.40 mg/dL   CBC   Result Value Ref Range    WBC 6.0 4.4 - 11.3 x10*3/uL    nRBC 0.0 0.0 - 0.0 /100 WBCs    RBC 2.86 (L) 4.00 - 5.20 x10*6/uL    Hemoglobin 8.6 (L) 12.0 - 16.0 g/dL    Hematocrit 26.6 (L) 36.0 - 46.0 %    MCV 93 80 - 100 fL    MCH 30.1 26.0 - 34.0 pg    MCHC 32.3 32.0 - 36.0 g/dL    RDW 13.9 11.5 - 14.5 %    Platelets 212 150 - 450 x10*3/uL    MPV 9.3 7.5 - 11.5 fL   Phosphorus   Result Value Ref Range    Phosphorus 3.4 2.5 - 4.9 mg/dL   Basic Metabolic Panel   Result Value Ref Range    Glucose 99 74 - 99 mg/dL    Sodium 143 136 - 145 mmol/L    Potassium 3.1 (L) 3.5 - 5.3 mmol/L    Chloride 106 98 - 107 mmol/L    Bicarbonate 31 21 - 32 mmol/L    Anion Gap 9 (L) 10 - 20 mmol/L    Urea Nitrogen 6 6 - 23 mg/dL    Creatinine 0.27 (L) 0.50 - 1.05 mg/dL    eGFR >90 >60 mL/min/1.73m*2    Calcium 7.9 (L) 8.6 - 10.3 mg/dL      Image Results  XR chest 1 view  Narrative: Interpreted By:  Mushtaq Squires,   STUDY:  XR CHEST 1 VIEW;  10/6/2023 11:37 am      INDICATION:  Signs/Symptoms:cough, altered mental status.      COMPARISON:  09/04/2023      ACCESSION NUMBER(S):  [id number]      ORDERING CLINICIAN:  JENA CHU      FINDINGS:  Patchy consolidation right mid to lower lung zone. The left lung  appears clear. No visualized pleural effusion. Unremarkable  cardiomediastinal silhouette. No pulmonary vascular  congestion.      Impression: Right mid to lower lung pneumonia.          Signed by: Mushtaq Squires 10/6/2023 11:52 AM  Dictation workstation:   [id number]  CT head wo IV contrast  Narrative: Interpreted By:  Mushtaq Squires,   STUDY:  CT HEAD WO IV CONTRAST;  10/6/2023 11:30 am      INDICATION:  pain.      COMPARISON:  09/04/2023      ACCESSION NUMBER(S):  [id number]      ORDERING CLINICIAN:  JENA CHU      TECHNIQUE:  Contiguous axial images of the head obtained without contrast.      FINDINGS:  Slightly motion degraded exam.      INTRACRANIAL:  Focal white matter low-attenuation in the right frontal  periventricular region is nonspecific in unchanged compared to prior  exams. No acute intracranial hemorrhage or mass effect. No midline  shift. Patent basal cisterns. No abnormal extra axial fluid  collections. The grey-white differentiation is preserved. The  calvaria appears intact.      EXTRACRANIAL:  Visualized paranasal sinuses and mastoids are clear.      Impression: No acute intracranial pathology identified.              Signed by: Mushtaq Squires 10/6/2023 11:50 AM  Dictation workstation:   [id number]       Assessment/Plan   This patient currently has cardiac telemetry ordered; if you would like to modify or discontinue the telemetry order, click here to go to the orders activity to modify/discontinue the order.    Pneumonia of right lower lobe due to infectious organism  - IV Rocephin 2g + 3 day course of IV Azithromycin  - White count resolved, sputum culture remains pending  -rocephin and azithromycin completed  -plan discharge home 10/10/23     Dysphagia  - Per ED & SNF report she is eating and drinking less  - PNA suspicious for aspiration  - SLP eval pending  - diet advanced to a soft diet     Severe protein-calorie malnutrition (CMS/HCC)  - Dietitian consulted      Continue home medications for chronic conditions  Chronic Medical Conditions        GERD (gastroesophageal reflux disease) (Chronic)      Multiple sclerosis (CMS/HCC) (Chronic)  -baseline non-ambulatory and a full lift.     Anemia (Chronic)     Myofascial pain (Chronic)     Neuropathic pain (Chronic)     S/P insertion of intrathecal pump (Chronic)     Overactive bladder (Chronic)     Neurogenic bladder (Chronic)  -johns catheter intact       RAIMUNDO Ledesma    Patient was seen in collaboration with the JUAN C, Melinda EUBANKS.  I was present for the pertinent components of the history, physical, and medical decision making and independently examined the patient.  We reviewed the medication reconciliation list and ancillary studies, including lab, imaging, and microbiology, as well as discussed the differential diagnoses. I agree with her plan as documented in the electronic medical record.    Delano Paula, DO  Internal Medicine

## 2023-10-09 NOTE — NURSING NOTE
2018 swallow eval completed by this nurse. Pt tolerated nectar and thin liquids. Speech remained clear, no coughing was present. Assisted the pt in trying to eat some pudding with success. Pt tolerated very well, no change in speech noted, no cough present. DO notified.    2049 evening medications administered with small sips of water. Pt tolerated well. Pt is more talkative and pleasant. Pt repositioned. Denies any pain or needs att.    0613 pt sleeping comfortably, no s/s of distress noted, no complaints of pain or needs att

## 2023-10-09 NOTE — CARE PLAN
The patient's goals for the shift include      The clinical goals for the shift include Pt will remain afebrile throughout shift    Over the shift, the patient did make progress toward the following goals.

## 2023-10-10 VITALS
HEART RATE: 80 BPM | BODY MASS INDEX: 21.49 KG/M2 | SYSTOLIC BLOOD PRESSURE: 94 MMHG | DIASTOLIC BLOOD PRESSURE: 55 MMHG | HEIGHT: 65 IN | WEIGHT: 128.97 LBS | TEMPERATURE: 96.9 F | OXYGEN SATURATION: 100 % | RESPIRATION RATE: 18 BRPM

## 2023-10-10 PROBLEM — R13.10 DYSPHAGIA: Status: RESOLVED | Noted: 2023-10-08 | Resolved: 2023-10-10

## 2023-10-10 PROBLEM — J18.9 PNEUMONIA OF RIGHT LOWER LOBE DUE TO INFECTIOUS ORGANISM: Status: RESOLVED | Noted: 2023-10-06 | Resolved: 2023-10-10

## 2023-10-10 LAB
ALBUMIN SERPL BCP-MCNC: 3.1 G/DL (ref 3.4–5)
ALP SERPL-CCNC: 55 U/L (ref 33–110)
ALT SERPL W P-5'-P-CCNC: 13 U/L (ref 7–45)
ANION GAP SERPL CALC-SCNC: 8 MMOL/L (ref 10–20)
AST SERPL W P-5'-P-CCNC: 11 U/L (ref 9–39)
BILIRUB DIRECT SERPL-MCNC: 0.1 MG/DL (ref 0–0.3)
BILIRUB SERPL-MCNC: 0.3 MG/DL (ref 0–1.2)
BUN SERPL-MCNC: 9 MG/DL (ref 6–23)
CALCIUM SERPL-MCNC: 8.3 MG/DL (ref 8.6–10.3)
CHLORIDE SERPL-SCNC: 105 MMOL/L (ref 98–107)
CO2 SERPL-SCNC: 32 MMOL/L (ref 21–32)
CREAT SERPL-MCNC: 0.3 MG/DL (ref 0.5–1.05)
ERYTHROCYTE [DISTWIDTH] IN BLOOD BY AUTOMATED COUNT: 13.7 % (ref 11.5–14.5)
GFR SERPL CREATININE-BSD FRML MDRD: >90 ML/MIN/1.73M*2
GLUCOSE SERPL-MCNC: 105 MG/DL (ref 74–99)
HCT VFR BLD AUTO: 25.3 % (ref 36–46)
HGB BLD-MCNC: 8.1 G/DL (ref 12–16)
MAGNESIUM SERPL-MCNC: 1.9 MG/DL (ref 1.6–2.4)
MCH RBC QN AUTO: 29.9 PG (ref 26–34)
MCHC RBC AUTO-ENTMCNC: 32 G/DL (ref 32–36)
MCV RBC AUTO: 93 FL (ref 80–100)
NRBC BLD-RTO: 0 /100 WBCS (ref 0–0)
PHOSPHATE SERPL-MCNC: 3.4 MG/DL (ref 2.5–4.9)
PLATELET # BLD AUTO: 256 X10*3/UL (ref 150–450)
PMV BLD AUTO: 9 FL (ref 7.5–11.5)
POTASSIUM SERPL-SCNC: 3.8 MMOL/L (ref 3.5–5.3)
PROT SERPL-MCNC: 5.4 G/DL (ref 6.4–8.2)
RBC # BLD AUTO: 2.71 X10*6/UL (ref 4–5.2)
SODIUM SERPL-SCNC: 141 MMOL/L (ref 136–145)
WBC # BLD AUTO: 6 X10*3/UL (ref 4.4–11.3)

## 2023-10-10 PROCEDURE — 80053 COMPREHEN METABOLIC PANEL: CPT | Mod: IPSPLIT | Performed by: STUDENT IN AN ORGANIZED HEALTH CARE EDUCATION/TRAINING PROGRAM

## 2023-10-10 PROCEDURE — 96372 THER/PROPH/DIAG INJ SC/IM: CPT | Mod: IPSPLIT | Performed by: STUDENT IN AN ORGANIZED HEALTH CARE EDUCATION/TRAINING PROGRAM

## 2023-10-10 PROCEDURE — 36415 COLL VENOUS BLD VENIPUNCTURE: CPT | Mod: IPSPLIT | Performed by: STUDENT IN AN ORGANIZED HEALTH CARE EDUCATION/TRAINING PROGRAM

## 2023-10-10 PROCEDURE — 99239 HOSP IP/OBS DSCHRG MGMT >30: CPT | Performed by: STUDENT IN AN ORGANIZED HEALTH CARE EDUCATION/TRAINING PROGRAM

## 2023-10-10 PROCEDURE — 82248 BILIRUBIN DIRECT: CPT | Mod: IPSPLIT | Performed by: STUDENT IN AN ORGANIZED HEALTH CARE EDUCATION/TRAINING PROGRAM

## 2023-10-10 PROCEDURE — 93005 ELECTROCARDIOGRAM TRACING: CPT | Mod: IPSPLIT

## 2023-10-10 PROCEDURE — 2500000004 HC RX 250 GENERAL PHARMACY W/ HCPCS (ALT 636 FOR OP/ED): Mod: IPSPLIT | Performed by: STUDENT IN AN ORGANIZED HEALTH CARE EDUCATION/TRAINING PROGRAM

## 2023-10-10 PROCEDURE — 83735 ASSAY OF MAGNESIUM: CPT | Mod: IPSPLIT | Performed by: STUDENT IN AN ORGANIZED HEALTH CARE EDUCATION/TRAINING PROGRAM

## 2023-10-10 PROCEDURE — 93010 ELECTROCARDIOGRAM REPORT: CPT | Performed by: INTERNAL MEDICINE

## 2023-10-10 PROCEDURE — 84100 ASSAY OF PHOSPHORUS: CPT | Mod: IPSPLIT | Performed by: STUDENT IN AN ORGANIZED HEALTH CARE EDUCATION/TRAINING PROGRAM

## 2023-10-10 PROCEDURE — 85027 COMPLETE CBC AUTOMATED: CPT | Mod: IPSPLIT | Performed by: STUDENT IN AN ORGANIZED HEALTH CARE EDUCATION/TRAINING PROGRAM

## 2023-10-10 PROCEDURE — 2500000001 HC RX 250 WO HCPCS SELF ADMINISTERED DRUGS (ALT 637 FOR MEDICARE OP): Mod: IPSPLIT | Performed by: STUDENT IN AN ORGANIZED HEALTH CARE EDUCATION/TRAINING PROGRAM

## 2023-10-10 RX ORDER — LORAZEPAM 0.5 MG/1
0.5 TABLET ORAL ONCE
Status: COMPLETED | OUTPATIENT
Start: 2023-10-10 | End: 2023-10-10

## 2023-10-10 RX ORDER — OXYBUTYNIN CHLORIDE 5 MG/1
5 TABLET ORAL 2 TIMES DAILY
Qty: 60 TABLET | Refills: 3 | Status: SHIPPED | OUTPATIENT
Start: 2023-10-10 | End: 2024-02-07

## 2023-10-10 RX ADMIN — POLYETHYLENE GLYCOL 3350 17 G: 17 POWDER, FOR SOLUTION ORAL at 08:25

## 2023-10-10 RX ADMIN — ENOXAPARIN SODIUM 40 MG: 100 INJECTION SUBCUTANEOUS at 08:25

## 2023-10-10 RX ADMIN — LORAZEPAM 0.5 MG: 0.5 TABLET ORAL at 02:34

## 2023-10-10 RX ADMIN — OXYBUTYNIN CHLORIDE 5 MG: 5 TABLET ORAL at 08:24

## 2023-10-10 RX ADMIN — PREGABALIN 200 MG: 100 CAPSULE ORAL at 08:24

## 2023-10-10 ASSESSMENT — PAIN - FUNCTIONAL ASSESSMENT: PAIN_FUNCTIONAL_ASSESSMENT: 0-10

## 2023-10-10 ASSESSMENT — COGNITIVE AND FUNCTIONAL STATUS - GENERAL
DRESSING REGULAR LOWER BODY CLOTHING: TOTAL
MOBILITY SCORE: 6
CLIMB 3 TO 5 STEPS WITH RAILING: TOTAL
TURNING FROM BACK TO SIDE WHILE IN FLAT BAD: TOTAL
DRESSING REGULAR UPPER BODY CLOTHING: TOTAL
MOVING FROM LYING ON BACK TO SITTING ON SIDE OF FLAT BED WITH BEDRAILS: TOTAL
PERSONAL GROOMING: TOTAL
WALKING IN HOSPITAL ROOM: TOTAL
HELP NEEDED FOR BATHING: TOTAL
TOILETING: TOTAL
MOVING TO AND FROM BED TO CHAIR: TOTAL
DAILY ACTIVITIY SCORE: 7
EATING MEALS: A LOT
STANDING UP FROM CHAIR USING ARMS: TOTAL

## 2023-10-10 ASSESSMENT — PAIN SCALES - GENERAL: PAINLEVEL_OUTOF10: 0 - NO PAIN

## 2023-10-10 NOTE — CARE PLAN
The patient's goals for the shift include      The clinical goals for the shift include Patient will tolerate IVABX well throughout the shift    Over the shift, the patient did not make progress toward the following goals. Barriers to progression include . Recommendations to address these barriers include

## 2023-10-10 NOTE — CARE PLAN
The patient's goals for the shift include      The clinical goals for the shift include Pt will tolerate IV K, Mag Sulf, and ATB through 1900    Over the shift, the patient did not make progress toward the following goals. Barriers to progression include patients appetite. Recommendations to address these barriers include increase intake frequency.

## 2023-10-10 NOTE — CARE PLAN
The patient's goals for the shift include      The clinical goals for the shift include Pt will tolerate IV K, Mag Sulf, and ATB through 1900    Over the shift, the patient did not make progress toward the following goals. Barriers to progression include . Recommendations to address these barriers include .

## 2023-10-10 NOTE — DISCHARGE SUMMARY
Discharge Diagnosis  Pneumonia of right lower lobe due to infectious organism    Issues Requiring Follow-Up  None    Test Results Pending At Discharge  Pending Labs       Order Current Status    Extra Tubes In process    Extra Urine Gray Tube In process    Curtis Top In process    Light Blue Top In process    Red Top In process    Urinalysis with Reflex Microscopic and Culture In process            Hospital Course   Shameka Zaragoza is a 53 y.o. female  with PMHx significant for Multiple Sclerosis, Neurogenic Bladder w/ chronic johns,  HTN, GERD , Anemia, and Neuropathic Pain who was brought to [facility] ED due to concern for change in mentation and decreased oral intake. She has advanced MS and unable to provide history,  was not at bedside.     ED Course: Afebrile & HDS  Lab work-up:  - Unremarkable CMP  - CBC revealed leukocytosis and normocytic anemia. Urinalysis suggestive of starvation ketosis  Imaging:  - CXR revealed right lung PNA  - CT Head unremarkable  Intervention:  - IVF and empiric antibiotics     See below for medical management during this hospitalization:  Pneumonia of right lower lobe due to infectious organism  - Completed IV Rocephin 2g + 3 day course of IV Azithromycin  - White count resolved, sputum culture remains pending     Dysphagia  - Per ED & SNF report she is eating and drinking less  - PNA suspicious for aspiration  - SLP eval revealed no dietary changes -> resume regular diet     Severe protein-calorie malnutrition (CMS/HCC)  - Dietitian consulted     Patient discharged home on 10/10/23.     Continue home medications for chronic conditions  Chronic Medical Conditions        GERD (gastroesophageal reflux disease) (Chronic)     Multiple sclerosis (CMS/HCC) (Chronic)     Anemia (Chronic)     Myofascial pain (Chronic)     Neuropathic pain (Chronic)     S/P insertion of intrathecal pump (Chronic)     Overactive bladder (Chronic)     Neurogenic bladder (Chronic)          Disposition: Patient can likely be discharged to her facility tomorrow after SLP & dietitian make recommendations       Pertinent Physical Exam At Time of Discharge  Physical Exam  Eyes:      Extraocular Movements: Extraocular movements intact.      Conjunctiva/sclera: Conjunctivae normal.   Cardiovascular:      Rate and Rhythm: Normal rate and regular rhythm.   Pulmonary:      Effort: Pulmonary effort is normal.      Breath sounds: Normal breath sounds.   Abdominal:      General: Abdomen is flat.   Neurological:      Mental Status: She is alert. Mental status is at baseline.         Home Medications     Medication List      START taking these medications     oxybutynin 5 mg tablet; Commonly known as: Ditropan; Take 1 tablet (5   mg) by mouth 2 times a day.; Replaces: Myrbetriq 25 mg tablet extended   release 24 hr 24 hr tablet     CONTINUE taking these medications     meclizine 25 mg tablet; Commonly known as: Antivert   ondansetron 4 mg tablet; Commonly known as: Zofran   pregabalin 200 mg capsule; Commonly known as: Lyrica     STOP taking these medications     Myrbetriq 25 mg tablet extended release 24 hr 24 hr tablet; Generic   drug: mirabegron; Replaced by: oxybutynin 5 mg tablet       Outpatient Follow-Up  No future appointments.    Delano Paula DO

## 2023-10-17 ENCOUNTER — DOCUMENTATION (OUTPATIENT)
Dept: HOME HEALTH SERVICES | Age: 53
End: 2023-10-17
Payer: MEDICARE

## 2023-10-18 ENCOUNTER — HOSPITAL ENCOUNTER (EMERGENCY)
Facility: HOSPITAL | Age: 53
Discharge: HOME | End: 2023-10-18
Attending: FAMILY MEDICINE
Payer: MEDICARE

## 2023-10-18 ENCOUNTER — APPOINTMENT (OUTPATIENT)
Dept: RADIOLOGY | Facility: HOSPITAL | Age: 53
End: 2023-10-18
Payer: MEDICARE

## 2023-10-18 VITALS
WEIGHT: 131.39 LBS | TEMPERATURE: 97.1 F | HEART RATE: 63 BPM | SYSTOLIC BLOOD PRESSURE: 114 MMHG | RESPIRATION RATE: 18 BRPM | DIASTOLIC BLOOD PRESSURE: 70 MMHG | OXYGEN SATURATION: 99 % | HEIGHT: 65 IN | BODY MASS INDEX: 21.89 KG/M2

## 2023-10-18 DIAGNOSIS — R33.9 URINARY RETENTION: ICD-10-CM

## 2023-10-18 DIAGNOSIS — R19.5 LOOSE STOOLS: Primary | ICD-10-CM

## 2023-10-18 DIAGNOSIS — K59.00 CONSTIPATION, UNSPECIFIED CONSTIPATION TYPE: ICD-10-CM

## 2023-10-18 LAB
ALBUMIN SERPL BCP-MCNC: 3.6 G/DL (ref 3.4–5)
ALP SERPL-CCNC: 63 U/L (ref 33–110)
ALT SERPL W P-5'-P-CCNC: 10 U/L (ref 7–45)
ANION GAP SERPL CALC-SCNC: 9 MMOL/L (ref 10–20)
APPEARANCE UR: CLEAR
AST SERPL W P-5'-P-CCNC: 10 U/L (ref 9–39)
BASE EXCESS BLDV CALC-SCNC: 11 MMOL/L (ref -2–3)
BASOPHILS # BLD AUTO: 0.02 X10*3/UL (ref 0–0.1)
BASOPHILS NFR BLD AUTO: 0.6 %
BILIRUB SERPL-MCNC: 0.2 MG/DL (ref 0–1.2)
BILIRUB UR STRIP.AUTO-MCNC: NEGATIVE MG/DL
BODY TEMPERATURE: 37 DEGREES CELSIUS
BUN SERPL-MCNC: 11 MG/DL (ref 6–23)
CALCIUM SERPL-MCNC: 9 MG/DL (ref 8.6–10.3)
CHLORIDE SERPL-SCNC: 102 MMOL/L (ref 98–107)
CO2 SERPL-SCNC: 35 MMOL/L (ref 21–32)
COLOR UR: COLORLESS
CREAT SERPL-MCNC: 0.42 MG/DL (ref 0.5–1.05)
EOSINOPHIL # BLD AUTO: 0.05 X10*3/UL (ref 0–0.7)
EOSINOPHIL NFR BLD AUTO: 1.6 %
ERYTHROCYTE [DISTWIDTH] IN BLOOD BY AUTOMATED COUNT: 14.1 % (ref 11.5–14.5)
GFR SERPL CREATININE-BSD FRML MDRD: >90 ML/MIN/1.73M*2
GLUCOSE SERPL-MCNC: 91 MG/DL (ref 74–99)
GLUCOSE UR STRIP.AUTO-MCNC: NEGATIVE MG/DL
HCO3 BLDV-SCNC: 39.5 MMOL/L (ref 22–26)
HCT VFR BLD AUTO: 29 % (ref 36–46)
HGB BLD-MCNC: 8.7 G/DL (ref 12–16)
HOLD SPECIMEN: NORMAL
IMM GRANULOCYTES # BLD AUTO: 0.02 X10*3/UL (ref 0–0.7)
IMM GRANULOCYTES NFR BLD AUTO: 0.6 % (ref 0–0.9)
INHALED O2 CONCENTRATION: 21 %
KETONES UR STRIP.AUTO-MCNC: NEGATIVE MG/DL
LEUKOCYTE ESTERASE UR QL STRIP.AUTO: NEGATIVE
LIPASE SERPL-CCNC: 10 U/L (ref 9–82)
LYMPHOCYTES # BLD AUTO: 1.24 X10*3/UL (ref 1.2–4.8)
LYMPHOCYTES NFR BLD AUTO: 39.6 %
MAGNESIUM SERPL-MCNC: 1.92 MG/DL (ref 1.6–2.4)
MCH RBC QN AUTO: 29.7 PG (ref 26–34)
MCHC RBC AUTO-ENTMCNC: 30 G/DL (ref 32–36)
MCV RBC AUTO: 99 FL (ref 80–100)
MONOCYTES # BLD AUTO: 0.33 X10*3/UL (ref 0.1–1)
MONOCYTES NFR BLD AUTO: 10.5 %
NEUTROPHILS # BLD AUTO: 1.47 X10*3/UL (ref 1.2–7.7)
NEUTROPHILS NFR BLD AUTO: 47.1 %
NITRITE UR QL STRIP.AUTO: NEGATIVE
NRBC BLD-RTO: 0 /100 WBCS (ref 0–0)
OXYHGB MFR BLDV: 53.3 % (ref 45–75)
PCO2 BLDV: 70 MM HG (ref 41–51)
PH BLDV: 7.36 PH (ref 7.33–7.43)
PH UR STRIP.AUTO: 7 [PH]
PLATELET # BLD AUTO: 309 X10*3/UL (ref 150–450)
PMV BLD AUTO: 8.1 FL (ref 7.5–11.5)
PO2 BLDV: 39 MM HG (ref 35–45)
POTASSIUM SERPL-SCNC: 3.7 MMOL/L (ref 3.5–5.3)
PROT SERPL-MCNC: 6.2 G/DL (ref 6.4–8.2)
PROT UR STRIP.AUTO-MCNC: NEGATIVE MG/DL
RBC # BLD AUTO: 2.93 X10*6/UL (ref 4–5.2)
RBC # UR STRIP.AUTO: NEGATIVE /UL
SAO2 % BLDV: 55 % (ref 45–75)
SODIUM SERPL-SCNC: 142 MMOL/L (ref 136–145)
SP GR UR STRIP.AUTO: 1.01
TEST COMMENT: ABNORMAL
UROBILINOGEN UR STRIP.AUTO-MCNC: <2 MG/DL
WBC # BLD AUTO: 3.1 X10*3/UL (ref 4.4–11.3)

## 2023-10-18 PROCEDURE — 85025 COMPLETE CBC W/AUTO DIFF WBC: CPT | Performed by: FAMILY MEDICINE

## 2023-10-18 PROCEDURE — 81003 URINALYSIS AUTO W/O SCOPE: CPT | Performed by: FAMILY MEDICINE

## 2023-10-18 PROCEDURE — 80053 COMPREHEN METABOLIC PANEL: CPT | Performed by: FAMILY MEDICINE

## 2023-10-18 PROCEDURE — 99284 EMERGENCY DEPT VISIT MOD MDM: CPT | Mod: 25 | Performed by: FAMILY MEDICINE

## 2023-10-18 PROCEDURE — 74177 CT ABD & PELVIS W/CONTRAST: CPT | Mod: MG

## 2023-10-18 PROCEDURE — 96360 HYDRATION IV INFUSION INIT: CPT | Mod: XU

## 2023-10-18 PROCEDURE — 74177 CT ABD & PELVIS W/CONTRAST: CPT | Performed by: RADIOLOGY

## 2023-10-18 PROCEDURE — 87493 C DIFF AMPLIFIED PROBE: CPT | Mod: CMCLAB,CONLAB | Performed by: FAMILY MEDICINE

## 2023-10-18 PROCEDURE — 82805 BLOOD GASES W/O2 SATURATION: CPT | Performed by: FAMILY MEDICINE

## 2023-10-18 PROCEDURE — 83735 ASSAY OF MAGNESIUM: CPT | Performed by: FAMILY MEDICINE

## 2023-10-18 PROCEDURE — 2500000004 HC RX 250 GENERAL PHARMACY W/ HCPCS (ALT 636 FOR OP/ED): Performed by: FAMILY MEDICINE

## 2023-10-18 PROCEDURE — 2550000001 HC RX 255 CONTRASTS: Performed by: FAMILY MEDICINE

## 2023-10-18 PROCEDURE — 36415 COLL VENOUS BLD VENIPUNCTURE: CPT | Performed by: FAMILY MEDICINE

## 2023-10-18 PROCEDURE — 83690 ASSAY OF LIPASE: CPT | Performed by: FAMILY MEDICINE

## 2023-10-18 RX ADMIN — SODIUM CHLORIDE 1000 ML: 9 INJECTION, SOLUTION INTRAVENOUS at 14:19

## 2023-10-18 RX ADMIN — IOHEXOL 75 ML: 350 INJECTION, SOLUTION INTRAVENOUS at 14:14

## 2023-10-18 ASSESSMENT — PAIN SCALES - GENERAL: PAINLEVEL_OUTOF10: 0 - NO PAIN

## 2023-10-18 ASSESSMENT — COLUMBIA-SUICIDE SEVERITY RATING SCALE - C-SSRS
2. HAVE YOU ACTUALLY HAD ANY THOUGHTS OF KILLING YOURSELF?: NO
6. HAVE YOU EVER DONE ANYTHING, STARTED TO DO ANYTHING, OR PREPARED TO DO ANYTHING TO END YOUR LIFE?: NO
1. IN THE PAST MONTH, HAVE YOU WISHED YOU WERE DEAD OR WISHED YOU COULD GO TO SLEEP AND NOT WAKE UP?: NO

## 2023-10-18 ASSESSMENT — PAIN - FUNCTIONAL ASSESSMENT: PAIN_FUNCTIONAL_ASSESSMENT: 0-10

## 2023-10-18 NOTE — ED PROVIDER NOTES
HPI   Chief Complaint   Patient presents with    Diarrhea     6-8 times a day       53-year-old female with history of multiple sclerosis comes the ED via family with concern of persistent loose stool for the last week status post discharge from the hospital.  Family and patient both report that she was admitted recently for infections and was started on antibiotics and discharged home.  Shortly thereafter getting home she continued her medications as advised and was doing well however she noted loose stool throughout the process persistently on a daily basis.   reports that she had several stools throughout the day anywhere ranging from 5-6 times.  Patient also corroborates his report and states that he is only had about 2 bouts of loose stool today.   reports that he is concerned because he is continue to persist is not sure if it is caused by infection or something else and finally brought her into the ED for reevaluation.  Patient reports no other associate symptoms or concerns at this time other than was provided by her .  Patient in the ED is alert, cooperative, appears anxious, comfortable, and in no distress.  Patient currently denies headache, blurred vision, neck pain, chest pain, back pain, abdominal pain, shortness of breath, fever/chills, fall/trauma, recent travel, sick contacts, vomiting, black tarry stools, blood in stool, dysuria, hematuria, dizziness, and weakness.      History provided by:  Patient and spouse   used: No                        Eugene Coma Scale Score: 15                  Patient History   Past Medical History:   Diagnosis Date    Dehydration 10/06/2023    Multiple sclerosis (CMS/[facility])      History reviewed. No pertinent surgical history.  No family history on file.  Social History     Tobacco Use    Smoking status: Never    Smokeless tobacco: Never   Substance Use Topics    Alcohol use: Not on file    Drug use: Not on file       Physical  Exam   ED Triage Vitals   Temp Heart Rate Resp BP   10/18/23 1316 10/18/23 1316 10/18/23 1316 10/18/23 1316   36.2 °C (97.1 °F) 57 17 97/60      SpO2 Temp src Heart Rate Source Patient Position   10/18/23 1415 -- -- --   97 %         BP Location FiO2 (%)     10/18/23 1725 --     Right arm        Physical Exam  Vitals and nursing note reviewed.   Constitutional:       General: She is not in acute distress.     Appearance: She is well-developed.   HENT:      Head: Normocephalic and atraumatic.   Eyes:      Conjunctiva/sclera: Conjunctivae normal.   Cardiovascular:      Rate and Rhythm: Normal rate and regular rhythm.      Heart sounds: No murmur heard.  Pulmonary:      Effort: Pulmonary effort is normal. No respiratory distress.      Breath sounds: Normal breath sounds.   Abdominal:      General: Abdomen is flat.      Palpations: Abdomen is soft.      Tenderness: There is no abdominal tenderness.   Musculoskeletal:         General: No swelling.      Cervical back: Neck supple.   Skin:     General: Skin is warm and dry.      Capillary Refill: Capillary refill takes less than 2 seconds.      Coloration: Skin is pale.   Neurological:      Mental Status: She is alert.   Psychiatric:         Mood and Affect: Mood normal.       Labs Reviewed   CBC WITH AUTO DIFFERENTIAL - Abnormal       Result Value    WBC 3.1 (*)     nRBC 0.0      RBC 2.93 (*)     Hemoglobin 8.7 (*)     Hematocrit 29.0 (*)     MCV 99      MCH 29.7      MCHC 30.0 (*)     RDW 14.1      Platelets 309      MPV 8.1      Neutrophils % 47.1      Immature Granulocytes %, Automated 0.6      Lymphocytes % 39.6      Monocytes % 10.5      Eosinophils % 1.6      Basophils % 0.6      Neutrophils Absolute 1.47      Immature Granulocytes Absolute, Automated 0.02      Lymphocytes Absolute 1.24      Monocytes Absolute 0.33      Eosinophils Absolute 0.05      Basophils Absolute 0.02     COMPREHENSIVE METABOLIC PANEL - Abnormal    Glucose 91      Sodium 142      Potassium 3.7       Chloride 102      Bicarbonate 35 (*)     Anion Gap 9 (*)     Urea Nitrogen 11      Creatinine 0.42 (*)     eGFR >90      Calcium 9.0      Albumin 3.6      Alkaline Phosphatase 63      Total Protein 6.2 (*)     AST 10      Bilirubin, Total 0.2      ALT 10     BLOOD GAS VENOUS - Abnormal    POCT pH, Venous 7.36      POCT pCO2, Venous 70 (*)     POCT pO2, Venous 39      POCT SO2, Venous 55      POCT Oxy Hemoglobin, Venous 53.3      POCT Base Excess, Venous 11.0 (*)     POCT HCO3 Calculated, Venous 39.5 (*)     Patient Temperature 37.0      FiO2 21      Test Comment [id number]     URINALYSIS WITH REFLEX MICROSCOPIC - Abnormal    Color, Urine Colorless (*)     Appearance, Urine Clear      Specific Gravity, Urine 1.009      pH, Urine 7.0      Protein, Urine NEGATIVE      Glucose, Urine NEGATIVE      Blood, Urine NEGATIVE      Ketones, Urine NEGATIVE      Bilirubin, Urine NEGATIVE      Urobilinogen, Urine <2.0      Nitrite, Urine NEGATIVE      Leukocyte Esterase, Urine NEGATIVE     MAGNESIUM - Normal    Magnesium 1.92     LIPASE - Normal    Lipase 10      Narrative:     Venipuncture immediately after or during the administration of Metamizole may lead to falsely low results. Testing should be performed immediately prior to Metamizole dosing.   C. DIFFICILE, PCR   GRAY TOP    Extra Tube Hold for add-ons.         CT abdomen pelvis w IV contrast   Final Result   1. No acute abdominal or pelvic pathology.   2. Constipation.   3. Status post cholecystectomy.   4. Bladder distention with a volume of 605 mL.        MACRO:   None        Signed by: Vero Wyatt 10/18/2023 2:42 PM   Dictation workstation:   [id number]          ED Course & MDM   Diagnoses as of 10/18/23 1853   Loose stools   Constipation, unspecified constipation type   Urinary retention       Medical Decision Making  Patient upon arrival to ED appeared to be anxious but comfortable and in no distress with stable vital signs.  Discussed with patient/family  presenting complaints and clinical findings.  Reviewed them patient's epic chart and counseled them on loose stool and appropriate approach to management/treatments.  After assessment and evaluation IV fluids are started, labs sent, imaging ordered, patient placed on cardiac monitor, and observed.  During patient's time the ED she did have 1 bout of loose stool but had no new physical plaints and otherwise was doing well.  Patient was straight cath in the ED and 900 cc of urine was taking out and sample was sent for testing.  Vital signs continue to be appropriate throughout stay.  Further instructions were discussed with  regarding frequency of straight cath due to patient's chronic neurological bladder.  Patient urine sample was resulted and found to be negative for UTI and remaining results were reviewed discussed with the patient and .  At this time discussion was had with patient regarding use of over-the-counter medication for management of her symptoms and need to contact her primary care doctor follow-up recheck in several days for continued assessment of these concerns she has regarding loose stool.  At this time she is also vies return to ED she feels shortness symptoms continue to persist or worsen for reevaluation in the ED.  Patient stable and discharged home with her .    Amount and/or Complexity of Data Reviewed  Independent Historian: spouse  External Data Reviewed: labs, radiology, ECG and notes.  Labs: ordered. Decision-making details documented in ED Course.  Radiology: ordered. Decision-making details documented in ED Course.    Risk  OTC drugs.        Procedure  Procedures     Wisam Boo MD  10/18/23 1922

## 2023-10-19 LAB — C DIF TOX TCDA+TCDB STL QL NAA+PROBE: NOT DETECTED

## 2023-10-23 ENCOUNTER — HOSPITAL ENCOUNTER (OUTPATIENT)
Dept: CARDIOLOGY | Facility: HOSPITAL | Age: 53
Discharge: HOME | End: 2023-10-23
Payer: MEDICARE

## 2023-10-23 LAB
ATRIAL RATE: 106 BPM
P AXIS: 79 DEGREES
P OFFSET: 204 MS
P ONSET: 157 MS
PR INTERVAL: 120 MS
Q ONSET: 217 MS
QRS COUNT: 17 BEATS
QRS DURATION: 88 MS
QT INTERVAL: 354 MS
QTC CALCULATION(BAZETT): 470 MS
QTC FREDERICIA: 427 MS
R AXIS: 75 DEGREES
T AXIS: 43 DEGREES
T OFFSET: 394 MS
VENTRICULAR RATE: 106 BPM

## 2023-12-13 LAB
HOLD SPECIMEN: NORMAL

## 2023-12-19 LAB
ATRIAL RATE: 91 BPM
P AXIS: 48 DEGREES
P OFFSET: 201 MS
P ONSET: 158 MS
PR INTERVAL: 138 MS
Q ONSET: 227 MS
QRS COUNT: 15 BEATS
QRS DURATION: 66 MS
QT INTERVAL: 356 MS
QTC CALCULATION(BAZETT): 437 MS
QTC FREDERICIA: 409 MS
R AXIS: 39 DEGREES
T AXIS: 34 DEGREES
T OFFSET: 405 MS
VENTRICULAR RATE: 91 BPM

## 2025-01-09 NOTE — CARE PLAN
Pt had uneventful shift, no c/o pain throughout shift, Pt received all ordered medications, Q2H turns,  at bedside most of shift, VSS, no needs voiced at this time   No

## 2025-01-22 ENCOUNTER — APPOINTMENT (OUTPATIENT)
Dept: CARDIOLOGY | Facility: HOSPITAL | Age: 55
End: 2025-01-22
Payer: MEDICARE

## 2025-01-22 ENCOUNTER — HOSPITAL ENCOUNTER (INPATIENT)
Facility: HOSPITAL | Age: 55
DRG: 640 | End: 2025-01-22
Attending: EMERGENCY MEDICINE | Admitting: STUDENT IN AN ORGANIZED HEALTH CARE EDUCATION/TRAINING PROGRAM
Payer: MEDICARE

## 2025-01-22 ENCOUNTER — APPOINTMENT (OUTPATIENT)
Dept: RADIOLOGY | Facility: HOSPITAL | Age: 55
End: 2025-01-22
Payer: MEDICARE

## 2025-01-22 DIAGNOSIS — E87.6 HYPOKALEMIA: ICD-10-CM

## 2025-01-22 DIAGNOSIS — G35 MULTIPLE SCLEROSIS (MULTI): Chronic | ICD-10-CM

## 2025-01-22 DIAGNOSIS — J18.9 PNEUMONIA OF BOTH LUNGS DUE TO INFECTIOUS ORGANISM, UNSPECIFIED PART OF LUNG: Primary | ICD-10-CM

## 2025-01-22 DIAGNOSIS — D50.0 IRON DEFICIENCY ANEMIA DUE TO CHRONIC BLOOD LOSS: ICD-10-CM

## 2025-01-22 DIAGNOSIS — E86.0 DEHYDRATION: ICD-10-CM

## 2025-01-22 LAB
ALBUMIN SERPL BCP-MCNC: 4 G/DL (ref 3.4–5)
ALP SERPL-CCNC: 126 U/L (ref 33–110)
ALT SERPL W P-5'-P-CCNC: 50 U/L (ref 7–45)
ANION GAP SERPL CALC-SCNC: 21 MMOL/L (ref 10–20)
APPEARANCE UR: CLEAR
AST SERPL W P-5'-P-CCNC: 65 U/L (ref 9–39)
BASOPHILS # BLD AUTO: 0.01 X10*3/UL (ref 0–0.1)
BASOPHILS NFR BLD AUTO: 0.1 %
BILIRUB SERPL-MCNC: 0.5 MG/DL (ref 0–1.2)
BILIRUB UR STRIP.AUTO-MCNC: NEGATIVE MG/DL
BUN SERPL-MCNC: 17 MG/DL (ref 6–23)
CALCIUM SERPL-MCNC: 9.3 MG/DL (ref 8.6–10.3)
CARDIAC TROPONIN I PNL SERPL HS: 42 NG/L (ref 0–13)
CARDIAC TROPONIN I PNL SERPL HS: 47 NG/L (ref 0–13)
CHLORIDE SERPL-SCNC: 89 MMOL/L (ref 98–107)
CO2 SERPL-SCNC: 27 MMOL/L (ref 21–32)
COLOR UR: YELLOW
CREAT SERPL-MCNC: 0.42 MG/DL (ref 0.5–1.05)
EGFRCR SERPLBLD CKD-EPI 2021: >90 ML/MIN/1.73M*2
EOSINOPHIL # BLD AUTO: 0 X10*3/UL (ref 0–0.7)
EOSINOPHIL NFR BLD AUTO: 0 %
ERYTHROCYTE [DISTWIDTH] IN BLOOD BY AUTOMATED COUNT: 14.2 % (ref 11.5–14.5)
FLUAV RNA RESP QL NAA+PROBE: NOT DETECTED
FLUBV RNA RESP QL NAA+PROBE: NOT DETECTED
GLUCOSE BLD MANUAL STRIP-MCNC: 166 MG/DL (ref 74–99)
GLUCOSE SERPL-MCNC: 69 MG/DL (ref 74–99)
GLUCOSE UR STRIP.AUTO-MCNC: NEGATIVE MG/DL
HCT VFR BLD AUTO: 28.6 % (ref 36–46)
HGB BLD-MCNC: 9.4 G/DL (ref 12–16)
HOLD SPECIMEN: NORMAL
HOLD SPECIMEN: NORMAL
IMM GRANULOCYTES # BLD AUTO: 0.11 X10*3/UL (ref 0–0.7)
IMM GRANULOCYTES NFR BLD AUTO: 1.1 % (ref 0–0.9)
KETONES UR STRIP.AUTO-MCNC: ABNORMAL MG/DL
LACTATE SERPL-SCNC: 1 MMOL/L (ref 0.4–2)
LEUKOCYTE ESTERASE UR QL STRIP.AUTO: NEGATIVE
LIPASE SERPL-CCNC: 10 U/L (ref 9–82)
LYMPHOCYTES # BLD AUTO: 0.85 X10*3/UL (ref 1.2–4.8)
LYMPHOCYTES NFR BLD AUTO: 8.7 %
MAGNESIUM SERPL-MCNC: 1.6 MG/DL (ref 1.6–2.4)
MCH RBC QN AUTO: 30.1 PG (ref 26–34)
MCHC RBC AUTO-ENTMCNC: 32.9 G/DL (ref 32–36)
MCV RBC AUTO: 92 FL (ref 80–100)
MONOCYTES # BLD AUTO: 0.72 X10*3/UL (ref 0.1–1)
MONOCYTES NFR BLD AUTO: 7.4 %
NEUTROPHILS # BLD AUTO: 8.04 X10*3/UL (ref 1.2–7.7)
NEUTROPHILS NFR BLD AUTO: 82.7 %
NITRITE UR QL STRIP.AUTO: NEGATIVE
NRBC BLD-RTO: 0 /100 WBCS (ref 0–0)
PH UR STRIP.AUTO: 5 [PH]
PLATELET # BLD AUTO: 163 X10*3/UL (ref 150–450)
POTASSIUM SERPL-SCNC: 2.7 MMOL/L (ref 3.5–5.3)
PROT SERPL-MCNC: 7.1 G/DL (ref 6.4–8.2)
PROT UR STRIP.AUTO-MCNC: NEGATIVE MG/DL
RBC # BLD AUTO: 3.12 X10*6/UL (ref 4–5.2)
RBC # UR STRIP.AUTO: NEGATIVE /UL
RSV RNA RESP QL NAA+PROBE: NOT DETECTED
SARS-COV-2 RNA RESP QL NAA+PROBE: NOT DETECTED
SODIUM SERPL-SCNC: 134 MMOL/L (ref 136–145)
SP GR UR STRIP.AUTO: 1.01
UROBILINOGEN UR STRIP.AUTO-MCNC: <2 MG/DL
WBC # BLD AUTO: 9.7 X10*3/UL (ref 4.4–11.3)

## 2025-01-22 PROCEDURE — 84484 ASSAY OF TROPONIN QUANT: CPT | Mod: 91 | Performed by: EMERGENCY MEDICINE

## 2025-01-22 PROCEDURE — 2550000001 HC RX 255 CONTRASTS: Performed by: EMERGENCY MEDICINE

## 2025-01-22 PROCEDURE — 36415 COLL VENOUS BLD VENIPUNCTURE: CPT | Performed by: EMERGENCY MEDICINE

## 2025-01-22 PROCEDURE — 74177 CT ABD & PELVIS W/CONTRAST: CPT

## 2025-01-22 PROCEDURE — 99285 EMERGENCY DEPT VISIT HI MDM: CPT | Mod: 25 | Performed by: EMERGENCY MEDICINE

## 2025-01-22 PROCEDURE — 87637 SARSCOV2&INF A&B&RSV AMP PRB: CPT | Performed by: EMERGENCY MEDICINE

## 2025-01-22 PROCEDURE — 2500000004 HC RX 250 GENERAL PHARMACY W/ HCPCS (ALT 636 FOR OP/ED): Performed by: EMERGENCY MEDICINE

## 2025-01-22 PROCEDURE — 71045 X-RAY EXAM CHEST 1 VIEW: CPT | Performed by: RADIOLOGY

## 2025-01-22 PROCEDURE — 93005 ELECTROCARDIOGRAM TRACING: CPT

## 2025-01-22 PROCEDURE — 96375 TX/PRO/DX INJ NEW DRUG ADDON: CPT | Mod: 59

## 2025-01-22 PROCEDURE — 84484 ASSAY OF TROPONIN QUANT: CPT | Performed by: EMERGENCY MEDICINE

## 2025-01-22 PROCEDURE — 74177 CT ABD & PELVIS W/CONTRAST: CPT | Performed by: RADIOLOGY

## 2025-01-22 PROCEDURE — 83605 ASSAY OF LACTIC ACID: CPT | Performed by: EMERGENCY MEDICINE

## 2025-01-22 PROCEDURE — 83735 ASSAY OF MAGNESIUM: CPT | Performed by: EMERGENCY MEDICINE

## 2025-01-22 PROCEDURE — 9420000001 HC RT PATIENT EDUCATION 5 MIN

## 2025-01-22 PROCEDURE — 71045 X-RAY EXAM CHEST 1 VIEW: CPT

## 2025-01-22 PROCEDURE — 82947 ASSAY GLUCOSE BLOOD QUANT: CPT

## 2025-01-22 PROCEDURE — 2500000005 HC RX 250 GENERAL PHARMACY W/O HCPCS

## 2025-01-22 PROCEDURE — 36415 COLL VENOUS BLD VENIPUNCTURE: CPT | Mod: 59 | Performed by: EMERGENCY MEDICINE

## 2025-01-22 PROCEDURE — 94760 N-INVAS EAR/PLS OXIMETRY 1: CPT | Mod: CCI

## 2025-01-22 PROCEDURE — 96365 THER/PROPH/DIAG IV INF INIT: CPT | Mod: 59

## 2025-01-22 PROCEDURE — 83690 ASSAY OF LIPASE: CPT | Performed by: EMERGENCY MEDICINE

## 2025-01-22 PROCEDURE — 96366 THER/PROPH/DIAG IV INF ADDON: CPT

## 2025-01-22 PROCEDURE — 80053 COMPREHEN METABOLIC PANEL: CPT | Performed by: EMERGENCY MEDICINE

## 2025-01-22 PROCEDURE — 96367 TX/PROPH/DG ADDL SEQ IV INF: CPT

## 2025-01-22 PROCEDURE — 81003 URINALYSIS AUTO W/O SCOPE: CPT | Performed by: EMERGENCY MEDICINE

## 2025-01-22 PROCEDURE — 85025 COMPLETE CBC W/AUTO DIFF WBC: CPT | Performed by: EMERGENCY MEDICINE

## 2025-01-22 PROCEDURE — 71260 CT THORAX DX C+: CPT | Performed by: RADIOLOGY

## 2025-01-22 RX ORDER — ENOXAPARIN SODIUM 100 MG/ML
40 INJECTION SUBCUTANEOUS EVERY 24 HOURS
Status: DISCONTINUED | OUTPATIENT
Start: 2025-01-22 | End: 2025-01-29 | Stop reason: HOSPADM

## 2025-01-22 RX ORDER — DEXTROSE 50 % IN WATER (D50W) INTRAVENOUS SYRINGE
25 ONCE
Status: COMPLETED | OUTPATIENT
Start: 2025-01-22 | End: 2025-01-22

## 2025-01-22 RX ORDER — CEFTRIAXONE 2 G/50ML
2 INJECTION, SOLUTION INTRAVENOUS EVERY 24 HOURS
Status: DISCONTINUED | OUTPATIENT
Start: 2025-01-22 | End: 2025-01-27

## 2025-01-22 RX ORDER — AMITRIPTYLINE HYDROCHLORIDE 10 MG/1
1 TABLET, FILM COATED ORAL NIGHTLY
COMMUNITY
Start: 2024-11-19

## 2025-01-22 RX ORDER — BENZONATATE 100 MG/1
100 CAPSULE ORAL 3 TIMES DAILY PRN
Status: DISCONTINUED | OUTPATIENT
Start: 2025-01-22 | End: 2025-01-29 | Stop reason: HOSPADM

## 2025-01-22 RX ORDER — SIMETHICONE 80 MG
80 TABLET,CHEWABLE ORAL 4 TIMES DAILY PRN
Status: DISCONTINUED | OUTPATIENT
Start: 2025-01-22 | End: 2025-01-29 | Stop reason: HOSPADM

## 2025-01-22 RX ORDER — AZITHROMYCIN MONOHYDRATE 500 MG/5ML
INJECTION, POWDER, LYOPHILIZED, FOR SOLUTION INTRAVENOUS
Status: COMPLETED
Start: 2025-01-22 | End: 2025-01-22

## 2025-01-22 RX ORDER — SODIUM CHLORIDE AND POTASSIUM CHLORIDE 300; 900 MG/100ML; MG/100ML
100 INJECTION, SOLUTION INTRAVENOUS CONTINUOUS
Status: DISCONTINUED | OUTPATIENT
Start: 2025-01-22 | End: 2025-01-24

## 2025-01-22 RX ORDER — SODIUM CHLORIDE 9 MG/ML
125 INJECTION, SOLUTION INTRAVENOUS CONTINUOUS
Status: DISCONTINUED | OUTPATIENT
Start: 2025-01-22 | End: 2025-01-22

## 2025-01-22 RX ORDER — POTASSIUM CHLORIDE 14.9 MG/ML
20 INJECTION INTRAVENOUS
Status: DISPENSED | OUTPATIENT
Start: 2025-01-22 | End: 2025-01-22

## 2025-01-22 RX ORDER — DOCUSATE SODIUM 100 MG/1
CAPSULE, LIQUID FILLED ORAL
Status: ON HOLD | COMMUNITY
End: 2025-01-23 | Stop reason: WASHOUT

## 2025-01-22 RX ORDER — ONDANSETRON HYDROCHLORIDE 2 MG/ML
4 INJECTION, SOLUTION INTRAVENOUS EVERY 4 HOURS PRN
Status: DISCONTINUED | OUTPATIENT
Start: 2025-01-22 | End: 2025-01-29 | Stop reason: HOSPADM

## 2025-01-22 RX ORDER — POLYETHYLENE GLYCOL 3350 17 G/17G
17 POWDER, FOR SOLUTION ORAL DAILY
Status: DISCONTINUED | OUTPATIENT
Start: 2025-01-23 | End: 2025-01-24

## 2025-01-22 RX ORDER — PERPHENAZINE AND AMITRIPTYLINE HYDROCHLORIDE 4; 10 MG/1; MG/1
1 TABLET, FILM COATED ORAL NIGHTLY
Status: ON HOLD | COMMUNITY
End: 2025-01-23 | Stop reason: WASHOUT

## 2025-01-22 RX ORDER — SOLIFENACIN SUCCINATE 10 MG/1
1 TABLET, FILM COATED ORAL DAILY
COMMUNITY
Start: 2023-05-05

## 2025-01-22 RX ORDER — BIOTIN 1 MG
1000 TABLET ORAL
COMMUNITY

## 2025-01-22 RX ORDER — CALCIUM CARBONATE 200(500)MG
500 TABLET,CHEWABLE ORAL 4 TIMES DAILY PRN
Status: DISCONTINUED | OUTPATIENT
Start: 2025-01-22 | End: 2025-01-29 | Stop reason: HOSPADM

## 2025-01-22 RX ORDER — DEXTROSE 50 % IN WATER (D50W) INTRAVENOUS SYRINGE
Status: COMPLETED
Start: 2025-01-22 | End: 2025-01-22

## 2025-01-22 RX ORDER — ACETAMINOPHEN 325 MG/1
650 TABLET ORAL EVERY 6 HOURS PRN
Status: DISCONTINUED | OUTPATIENT
Start: 2025-01-22 | End: 2025-01-23

## 2025-01-22 RX ORDER — TALC
3 POWDER (GRAM) TOPICAL NIGHTLY PRN
Status: DISCONTINUED | OUTPATIENT
Start: 2025-01-22 | End: 2025-01-29 | Stop reason: HOSPADM

## 2025-01-22 RX ADMIN — DEXTROSE MONOHYDRATE 25 G: 25 INJECTION, SOLUTION INTRAVENOUS at 20:18

## 2025-01-22 RX ADMIN — CEFTRIAXONE 2 G: 2 INJECTION, SOLUTION INTRAVENOUS at 22:30

## 2025-01-22 RX ADMIN — SODIUM CHLORIDE 125 ML/HR: 9 INJECTION, SOLUTION INTRAVENOUS at 19:48

## 2025-01-22 RX ADMIN — AZITHROMYCIN MONOHYDRATE 500 MG: 500 INJECTION, POWDER, LYOPHILIZED, FOR SOLUTION INTRAVENOUS at 22:56

## 2025-01-22 RX ADMIN — DEXTROSE 50 % IN WATER (D50W) INTRAVENOUS SYRINGE 25 G: at 20:18

## 2025-01-22 RX ADMIN — IOHEXOL 75 ML: 350 INJECTION, SOLUTION INTRAVENOUS at 20:31

## 2025-01-22 RX ADMIN — POTASSIUM CHLORIDE 20 MEQ: 14.9 INJECTION, SOLUTION INTRAVENOUS at 19:59

## 2025-01-22 RX ADMIN — SODIUM CHLORIDE, POTASSIUM CHLORIDE, SODIUM LACTATE AND CALCIUM CHLORIDE 1000 ML: 600; 310; 30; 20 INJECTION, SOLUTION INTRAVENOUS at 19:46

## 2025-01-22 ASSESSMENT — PAIN - FUNCTIONAL ASSESSMENT: PAIN_FUNCTIONAL_ASSESSMENT: UNABLE TO SELF-REPORT

## 2025-01-23 PROBLEM — K59.09 OTHER CONSTIPATION: Status: ACTIVE | Noted: 2025-01-23

## 2025-01-23 PROBLEM — G47.33 OSA (OBSTRUCTIVE SLEEP APNEA): Status: ACTIVE | Noted: 2025-01-23

## 2025-01-23 PROBLEM — R79.89 ELEVATED TROPONIN LEVEL NOT DUE MYOCARDIAL INFARCTION: Status: ACTIVE | Noted: 2025-01-23

## 2025-01-23 PROBLEM — J96.01 ACUTE RESPIRATORY FAILURE WITH HYPOXIA (MULTI): Status: ACTIVE | Noted: 2025-01-23

## 2025-01-23 PROBLEM — J15.9 COMMUNITY ACQUIRED BACTERIAL PNEUMONIA: Status: ACTIVE | Noted: 2025-01-23

## 2025-01-23 PROBLEM — J18.9 PNEUMONIA OF BOTH LUNGS DUE TO INFECTIOUS ORGANISM, UNSPECIFIED PART OF LUNG: Status: ACTIVE | Noted: 2025-01-23

## 2025-01-23 LAB
ANION GAP SERPL CALC-SCNC: 13 MMOL/L (ref 10–20)
ATRIAL RATE: 67 BPM
ATRIAL RATE: 67 BPM
BUN SERPL-MCNC: 11 MG/DL (ref 6–23)
CALCIUM SERPL-MCNC: 8.5 MG/DL (ref 8.6–10.3)
CHLORIDE SERPL-SCNC: 97 MMOL/L (ref 98–107)
CO2 SERPL-SCNC: 29 MMOL/L (ref 21–32)
CREAT SERPL-MCNC: 0.36 MG/DL (ref 0.5–1.05)
EGFRCR SERPLBLD CKD-EPI 2021: >90 ML/MIN/1.73M*2
ERYTHROCYTE [DISTWIDTH] IN BLOOD BY AUTOMATED COUNT: 14 % (ref 11.5–14.5)
FERRITIN SERPL-MCNC: 255 NG/ML (ref 8–150)
GLUCOSE SERPL-MCNC: 72 MG/DL (ref 74–99)
HCT VFR BLD AUTO: 24.6 % (ref 36–46)
HGB BLD-MCNC: 8.1 G/DL (ref 12–16)
HOLD SPECIMEN: NORMAL
IRON SATN MFR SERPL: 17 % (ref 25–45)
IRON SERPL-MCNC: 40 UG/DL (ref 35–150)
MCH RBC QN AUTO: 29.5 PG (ref 26–34)
MCHC RBC AUTO-ENTMCNC: 32.9 G/DL (ref 32–36)
MCV RBC AUTO: 90 FL (ref 80–100)
NRBC BLD-RTO: 0.2 /100 WBCS (ref 0–0)
P AXIS: 66 DEGREES
P AXIS: 70 DEGREES
P OFFSET: 195 MS
P OFFSET: 202 MS
P ONSET: 143 MS
P ONSET: 146 MS
PLATELET # BLD AUTO: 153 X10*3/UL (ref 150–450)
POTASSIUM SERPL-SCNC: 3 MMOL/L (ref 3.5–5.3)
PR INTERVAL: 150 MS
PR INTERVAL: 154 MS
Q ONSET: 220 MS
Q ONSET: 221 MS
QRS COUNT: 11 BEATS
QRS COUNT: 11 BEATS
QRS DURATION: 98 MS
QRS DURATION: 98 MS
QT INTERVAL: 414 MS
QT INTERVAL: 416 MS
QTC CALCULATION(BAZETT): 437 MS
QTC CALCULATION(BAZETT): 439 MS
QTC FREDERICIA: 429 MS
QTC FREDERICIA: 431 MS
R AXIS: 59 DEGREES
R AXIS: 61 DEGREES
RBC # BLD AUTO: 2.75 X10*6/UL (ref 4–5.2)
SODIUM SERPL-SCNC: 136 MMOL/L (ref 136–145)
T AXIS: 52 DEGREES
T AXIS: 55 DEGREES
T OFFSET: 428 MS
T OFFSET: 428 MS
TIBC SERPL-MCNC: 229 UG/DL (ref 240–445)
UIBC SERPL-MCNC: 189 UG/DL (ref 110–370)
VENTRICULAR RATE: 67 BPM
VENTRICULAR RATE: 67 BPM
WBC # BLD AUTO: 8.1 X10*3/UL (ref 4.4–11.3)

## 2025-01-23 PROCEDURE — 2500000002 HC RX 250 W HCPCS SELF ADMINISTERED DRUGS (ALT 637 FOR MEDICARE OP, ALT 636 FOR OP/ED): Performed by: STUDENT IN AN ORGANIZED HEALTH CARE EDUCATION/TRAINING PROGRAM

## 2025-01-23 PROCEDURE — 2500000004 HC RX 250 GENERAL PHARMACY W/ HCPCS (ALT 636 FOR OP/ED): Mod: IPSPLIT | Performed by: STUDENT IN AN ORGANIZED HEALTH CARE EDUCATION/TRAINING PROGRAM

## 2025-01-23 PROCEDURE — 9420000001 HC RT PATIENT EDUCATION 5 MIN

## 2025-01-23 PROCEDURE — 82728 ASSAY OF FERRITIN: CPT | Performed by: NURSE PRACTITIONER

## 2025-01-23 PROCEDURE — 2500000005 HC RX 250 GENERAL PHARMACY W/O HCPCS: Mod: IPSPLIT | Performed by: STUDENT IN AN ORGANIZED HEALTH CARE EDUCATION/TRAINING PROGRAM

## 2025-01-23 PROCEDURE — 83550 IRON BINDING TEST: CPT | Performed by: NURSE PRACTITIONER

## 2025-01-23 PROCEDURE — 94760 N-INVAS EAR/PLS OXIMETRY 1: CPT | Mod: MUE

## 2025-01-23 PROCEDURE — 2500000001 HC RX 250 WO HCPCS SELF ADMINISTERED DRUGS (ALT 637 FOR MEDICARE OP): Performed by: NURSE PRACTITIONER

## 2025-01-23 PROCEDURE — 2500000001 HC RX 250 WO HCPCS SELF ADMINISTERED DRUGS (ALT 637 FOR MEDICARE OP): Performed by: STUDENT IN AN ORGANIZED HEALTH CARE EDUCATION/TRAINING PROGRAM

## 2025-01-23 PROCEDURE — 36415 COLL VENOUS BLD VENIPUNCTURE: CPT | Performed by: STUDENT IN AN ORGANIZED HEALTH CARE EDUCATION/TRAINING PROGRAM

## 2025-01-23 PROCEDURE — 87075 CULTR BACTERIA EXCEPT BLOOD: CPT | Mod: CONLAB | Performed by: NURSE PRACTITIONER

## 2025-01-23 PROCEDURE — 2580000001 HC RX 258 IV SOLUTIONS: Mod: IPSPLIT | Performed by: NURSE PRACTITIONER

## 2025-01-23 PROCEDURE — 96376 TX/PRO/DX INJ SAME DRUG ADON: CPT

## 2025-01-23 PROCEDURE — 36415 COLL VENOUS BLD VENIPUNCTURE: CPT | Performed by: NURSE PRACTITIONER

## 2025-01-23 PROCEDURE — 84145 PROCALCITONIN (PCT): CPT | Mod: CONLAB | Performed by: NURSE PRACTITIONER

## 2025-01-23 PROCEDURE — 80048 BASIC METABOLIC PNL TOTAL CA: CPT | Performed by: STUDENT IN AN ORGANIZED HEALTH CARE EDUCATION/TRAINING PROGRAM

## 2025-01-23 PROCEDURE — 85027 COMPLETE CBC AUTOMATED: CPT | Performed by: STUDENT IN AN ORGANIZED HEALTH CARE EDUCATION/TRAINING PROGRAM

## 2025-01-23 PROCEDURE — 82374 ASSAY BLOOD CARBON DIOXIDE: CPT | Performed by: STUDENT IN AN ORGANIZED HEALTH CARE EDUCATION/TRAINING PROGRAM

## 2025-01-23 PROCEDURE — 1100000001 HC PRIVATE ROOM DAILY: Mod: IPSPLIT

## 2025-01-23 PROCEDURE — 2580000001 HC RX 258 IV SOLUTIONS: Performed by: STUDENT IN AN ORGANIZED HEALTH CARE EDUCATION/TRAINING PROGRAM

## 2025-01-23 PROCEDURE — 2500000004 HC RX 250 GENERAL PHARMACY W/ HCPCS (ALT 636 FOR OP/ED): Performed by: NURSE PRACTITIONER

## 2025-01-23 PROCEDURE — 83540 ASSAY OF IRON: CPT | Performed by: NURSE PRACTITIONER

## 2025-01-23 PROCEDURE — 99222 1ST HOSP IP/OBS MODERATE 55: CPT | Performed by: NURSE PRACTITIONER

## 2025-01-23 RX ORDER — SENNOSIDES 8.6 MG/1
2 TABLET ORAL 2 TIMES DAILY
Status: DISCONTINUED | OUTPATIENT
Start: 2025-01-23 | End: 2025-01-29 | Stop reason: HOSPADM

## 2025-01-23 RX ORDER — ALBUTEROL SULFATE 0.83 MG/ML
2.5 SOLUTION RESPIRATORY (INHALATION) EVERY 2 HOUR PRN
Status: DISCONTINUED | OUTPATIENT
Start: 2025-01-23 | End: 2025-01-29 | Stop reason: HOSPADM

## 2025-01-23 RX ORDER — ACETAMINOPHEN 650 MG/1
650 SUPPOSITORY RECTAL EVERY 6 HOURS PRN
Status: DISCONTINUED | OUTPATIENT
Start: 2025-01-23 | End: 2025-01-25

## 2025-01-23 RX ORDER — OXYBUTYNIN CHLORIDE 5 MG/1
5 TABLET ORAL 3 TIMES DAILY
Status: DISCONTINUED | OUTPATIENT
Start: 2025-01-23 | End: 2025-01-24

## 2025-01-23 RX ORDER — PERPHENAZINE 4 MG/1
4 TABLET ORAL NIGHTLY
Status: DISCONTINUED | OUTPATIENT
Start: 2025-01-23 | End: 2025-01-24

## 2025-01-23 RX ORDER — DEXTROSE MONOHYDRATE, SODIUM CHLORIDE, AND POTASSIUM CHLORIDE 50; 2.98; 9 G/1000ML; G/1000ML; G/1000ML
100 INJECTION, SOLUTION INTRAVENOUS CONTINUOUS
Status: DISCONTINUED | OUTPATIENT
Start: 2025-01-23 | End: 2025-01-23

## 2025-01-23 RX ORDER — PREGABALIN 100 MG/1
200 CAPSULE ORAL 2 TIMES DAILY
Status: DISCONTINUED | OUTPATIENT
Start: 2025-01-23 | End: 2025-01-24

## 2025-01-23 RX ORDER — DOCUSATE SODIUM 100 MG/1
100 CAPSULE, LIQUID FILLED ORAL 2 TIMES DAILY
Status: DISCONTINUED | OUTPATIENT
Start: 2025-01-23 | End: 2025-01-24

## 2025-01-23 RX ORDER — MECLIZINE HYDROCHLORIDE 25 MG/1
25 TABLET ORAL 3 TIMES DAILY PRN
Status: DISCONTINUED | OUTPATIENT
Start: 2025-01-23 | End: 2025-01-29 | Stop reason: HOSPADM

## 2025-01-23 RX ORDER — AMITRIPTYLINE HYDROCHLORIDE 10 MG/1
10 TABLET, FILM COATED ORAL NIGHTLY
Status: DISCONTINUED | OUTPATIENT
Start: 2025-01-23 | End: 2025-01-24

## 2025-01-23 RX ORDER — BIOTIN 1 MG
1000 TABLET ORAL DAILY
Status: DISCONTINUED | OUTPATIENT
Start: 2025-01-23 | End: 2025-01-23

## 2025-01-23 RX ORDER — NITROFURANTOIN (MACROCRYSTALS) 100 MG/1
100 CAPSULE ORAL DAILY
COMMUNITY

## 2025-01-23 RX ORDER — MIRABEGRON 25 MG/1
25 TABLET, FILM COATED, EXTENDED RELEASE ORAL DAILY
COMMUNITY

## 2025-01-23 RX ORDER — BISACODYL 10 MG/1
10 SUPPOSITORY RECTAL DAILY
Status: DISCONTINUED | OUTPATIENT
Start: 2025-01-23 | End: 2025-01-29 | Stop reason: HOSPADM

## 2025-01-23 RX ADMIN — AZITHROMYCIN MONOHYDRATE 500 MG: 500 INJECTION, POWDER, LYOPHILIZED, FOR SOLUTION INTRAVENOUS at 23:19

## 2025-01-23 RX ADMIN — POTASSIUM CHLORIDE AND SODIUM CHLORIDE 100 ML/HR: 900; 300 INJECTION, SOLUTION INTRAVENOUS at 01:00

## 2025-01-23 RX ADMIN — SODIUM CHLORIDE 300 MG: 9 INJECTION, SOLUTION INTRAVENOUS at 15:47

## 2025-01-23 RX ADMIN — POTASSIUM CHLORIDE AND SODIUM CHLORIDE 100 ML/HR: 900; 300 INJECTION, SOLUTION INTRAVENOUS at 21:08

## 2025-01-23 RX ADMIN — CEFTRIAXONE 2 G: 2 INJECTION, SOLUTION INTRAVENOUS at 21:02

## 2025-01-23 RX ADMIN — ACETAMINOPHEN 650 MG: 650 SUPPOSITORY RECTAL at 16:23

## 2025-01-23 RX ADMIN — Medication 5 L/MIN: at 04:48

## 2025-01-23 RX ADMIN — POTASSIUM CHLORIDE AND SODIUM CHLORIDE 100 ML/HR: 900; 300 INJECTION, SOLUTION INTRAVENOUS at 11:12

## 2025-01-23 RX ADMIN — ENOXAPARIN SODIUM 40 MG: 100 INJECTION SUBCUTANEOUS at 23:15

## 2025-01-23 RX ADMIN — BISACODYL 10 MG: 10 SUPPOSITORY RECTAL at 08:48

## 2025-01-23 RX ADMIN — SODIUM PHOSPHATE, DIBASIC AND SODIUM PHOSPHATE, MONOBASIC 1 ENEMA: 7; 19 ENEMA RECTAL at 21:02

## 2025-01-23 RX ADMIN — POLYETHYLENE GLYCOL 3350 17 G: 17 POWDER, FOR SOLUTION ORAL at 09:36

## 2025-01-23 SDOH — SOCIAL STABILITY: SOCIAL INSECURITY: DO YOU FEEL UNSAFE GOING BACK TO THE PLACE WHERE YOU ARE LIVING?: UNABLE TO ASSESS

## 2025-01-23 SDOH — SOCIAL STABILITY: SOCIAL INSECURITY: ARE YOU OR HAVE YOU BEEN THREATENED OR ABUSED PHYSICALLY, EMOTIONALLY, OR SEXUALLY BY ANYONE?: UNABLE TO ASSESS

## 2025-01-23 SDOH — SOCIAL STABILITY: SOCIAL INSECURITY: WERE YOU ABLE TO COMPLETE ALL THE BEHAVIORAL HEALTH SCREENINGS?: YES

## 2025-01-23 SDOH — ECONOMIC STABILITY: FOOD INSECURITY
WITHIN THE PAST 12 MONTHS, THE FOOD YOU BOUGHT JUST DIDN'T LAST AND YOU DIDN'T HAVE MONEY TO GET MORE.: PATIENT UNABLE TO ANSWER

## 2025-01-23 SDOH — ECONOMIC STABILITY: TRANSPORTATION INSECURITY
IN THE PAST 12 MONTHS, HAS LACK OF TRANSPORTATION KEPT YOU FROM MEDICAL APPOINTMENTS OR FROM GETTING MEDICATIONS?: PATIENT UNABLE TO ANSWER

## 2025-01-23 SDOH — SOCIAL STABILITY: SOCIAL INSECURITY: DO YOU FEEL ANYONE HAS EXPLOITED OR TAKEN ADVANTAGE OF YOU FINANCIALLY OR OF YOUR PERSONAL PROPERTY?: UNABLE TO ASSESS

## 2025-01-23 SDOH — SOCIAL STABILITY: SOCIAL INSECURITY: HAVE YOU HAD ANY THOUGHTS OF HARMING ANYONE ELSE?: UNABLE TO ASSESS

## 2025-01-23 SDOH — SOCIAL STABILITY: SOCIAL INSECURITY
WITHIN THE LAST YEAR, HAVE YOU BEEN KICKED, HIT, SLAPPED, OR OTHERWISE PHYSICALLY HURT BY YOUR PARTNER OR EX-PARTNER?: PATIENT UNABLE TO ANSWER

## 2025-01-23 SDOH — ECONOMIC STABILITY: FOOD INSECURITY
HOW HARD IS IT FOR YOU TO PAY FOR THE VERY BASICS LIKE FOOD, HOUSING, MEDICAL CARE, AND HEATING?: PATIENT UNABLE TO ANSWER

## 2025-01-23 SDOH — ECONOMIC STABILITY: FOOD INSECURITY
WITHIN THE PAST 12 MONTHS, YOU WORRIED THAT YOUR FOOD WOULD RUN OUT BEFORE YOU GOT THE MONEY TO BUY MORE.: PATIENT UNABLE TO ANSWER

## 2025-01-23 SDOH — SOCIAL STABILITY: SOCIAL INSECURITY: HAVE YOU HAD THOUGHTS OF HARMING ANYONE ELSE?: NO

## 2025-01-23 SDOH — SOCIAL STABILITY: SOCIAL INSECURITY: HAS ANYONE EVER THREATENED TO HURT YOUR FAMILY OR YOUR PETS?: UNABLE TO ASSESS

## 2025-01-23 SDOH — ECONOMIC STABILITY: HOUSING INSECURITY: IN THE PAST 12 MONTHS, HOW MANY TIMES HAVE YOU MOVED WHERE YOU WERE LIVING?: 0

## 2025-01-23 SDOH — ECONOMIC STABILITY: INCOME INSECURITY
IN THE PAST 12 MONTHS HAS THE ELECTRIC, GAS, OIL, OR WATER COMPANY THREATENED TO SHUT OFF SERVICES IN YOUR HOME?: PATIENT UNABLE TO ANSWER

## 2025-01-23 SDOH — ECONOMIC STABILITY: HOUSING INSECURITY
IN THE LAST 12 MONTHS, WAS THERE A TIME WHEN YOU WERE NOT ABLE TO PAY THE MORTGAGE OR RENT ON TIME?: PATIENT UNABLE TO ANSWER

## 2025-01-23 SDOH — SOCIAL STABILITY: SOCIAL INSECURITY
WITHIN THE LAST YEAR, HAVE YOU BEEN HUMILIATED OR EMOTIONALLY ABUSED IN OTHER WAYS BY YOUR PARTNER OR EX-PARTNER?: PATIENT UNABLE TO ANSWER

## 2025-01-23 SDOH — ECONOMIC STABILITY: HOUSING INSECURITY: AT ANY TIME IN THE PAST 12 MONTHS, WERE YOU HOMELESS OR LIVING IN A SHELTER (INCLUDING NOW)?: PATIENT UNABLE TO ANSWER

## 2025-01-23 SDOH — SOCIAL STABILITY: SOCIAL INSECURITY
WITHIN THE LAST YEAR, HAVE YOU BEEN RAPED OR FORCED TO HAVE ANY KIND OF SEXUAL ACTIVITY BY YOUR PARTNER OR EX-PARTNER?: PATIENT UNABLE TO ANSWER

## 2025-01-23 SDOH — SOCIAL STABILITY: SOCIAL INSECURITY: ABUSE: ADULT

## 2025-01-23 SDOH — SOCIAL STABILITY: SOCIAL INSECURITY: ARE THERE ANY APPARENT SIGNS OF INJURIES/BEHAVIORS THAT COULD BE RELATED TO ABUSE/NEGLECT?: UNABLE TO ASSESS

## 2025-01-23 SDOH — SOCIAL STABILITY: SOCIAL INSECURITY: WITHIN THE LAST YEAR, HAVE YOU BEEN AFRAID OF YOUR PARTNER OR EX-PARTNER?: PATIENT UNABLE TO ANSWER

## 2025-01-23 SDOH — SOCIAL STABILITY: SOCIAL INSECURITY: DOES ANYONE TRY TO KEEP YOU FROM HAVING/CONTACTING OTHER FRIENDS OR DOING THINGS OUTSIDE YOUR HOME?: UNABLE TO ASSESS

## 2025-01-23 ASSESSMENT — PAIN SCALES - PAIN ASSESSMENT IN ADVANCED DEMENTIA (PAINAD)
BREATHING: NORMAL
TOTALSCORE: 0
TOTALSCORE: 0
FACIALEXPRESSION: SMILING OR INEXPRESSIVE
CONSOLABILITY: NO NEED TO CONSOLE
FACIALEXPRESSION: SMILING OR INEXPRESSIVE
BREATHING: NORMAL
FACIALEXPRESSION: SMILING OR INEXPRESSIVE
CONSOLABILITY: NO NEED TO CONSOLE
FACIALEXPRESSION: SMILING OR INEXPRESSIVE
BREATHING: NORMAL
BODYLANGUAGE: RELAXED
BREATHING: NORMAL
BODYLANGUAGE: RELAXED
TOTALSCORE: 0
BODYLANGUAGE: RELAXED
CONSOLABILITY: NO NEED TO CONSOLE
CONSOLABILITY: NO NEED TO CONSOLE
BODYLANGUAGE: RELAXED
TOTALSCORE: 0

## 2025-01-23 ASSESSMENT — ACTIVITIES OF DAILY LIVING (ADL)
HEARING - LEFT EAR: UNABLE TO ASSESS
LACK_OF_TRANSPORTATION: PATIENT UNABLE TO ANSWER
FEEDING YOURSELF: DEPENDENT
PATIENT'S MEMORY ADEQUATE TO SAFELY COMPLETE DAILY ACTIVITIES?: NO
BATHING: DEPENDENT
TOILETING: DEPENDENT
DRESSING YOURSELF: DEPENDENT
JUDGMENT_ADEQUATE_SAFELY_COMPLETE_DAILY_ACTIVITIES: NO
HEARING - RIGHT EAR: UNABLE TO ASSESS
ADEQUATE_TO_COMPLETE_ADL: NO
GROOMING: DEPENDENT
WALKS IN HOME: DEPENDENT

## 2025-01-23 ASSESSMENT — COGNITIVE AND FUNCTIONAL STATUS - GENERAL: PATIENT BASELINE BEDBOUND: YES

## 2025-01-23 ASSESSMENT — LIFESTYLE VARIABLES
HOW OFTEN DO YOU HAVE A DRINK CONTAINING ALCOHOL: PATIENT UNABLE TO ANSWER
AUDIT-C TOTAL SCORE: -1
SKIP TO QUESTIONS 9-10: 0
HOW MANY STANDARD DRINKS CONTAINING ALCOHOL DO YOU HAVE ON A TYPICAL DAY: PATIENT UNABLE TO ANSWER
HOW OFTEN DO YOU HAVE 6 OR MORE DRINKS ON ONE OCCASION: PATIENT UNABLE TO ANSWER
AUDIT-C TOTAL SCORE: -1

## 2025-01-23 ASSESSMENT — PAIN SCALES - WONG BAKER
WONGBAKER_NUMERICALRESPONSE: NO HURT
WONGBAKER_NUMERICALRESPONSE: NO HURT

## 2025-01-23 NOTE — H&P
History Of Present Illness   Shameka Zaragoza is a 54 y.o. female presenting with a complaintof decreased oral intake. She has progressive multiple sclerosis and has not been wanting to eat or drink since 1/19/25.  is concerned she is becoming dehydrated. She had 1 episode of emesis. There has been no fever, chills, or change in mentation. She has been more fatigue and increased sleepiness.  was unable to get her to take anything by mouth yesterday.    In the ED:  VS: T36.6 HR 68, R 16, /78, SpO2 100%  Lab: Glu 69; Na 134; K 2.7; Bun 17; Cr 0.42; Alk phos 126; ALT 50; AST 65; trop 47 > 42; WBC 9.1; Hb 9.4; Hcrt 28.6;   Radiology: CXR no acute cardiopulmonary disease. CT chest/abd extensive nodular, ground-glass and confluent parenchymal opacities  with lower lobe predominance suggestive of multifocal infectious/inflammatory process. Extensive colonic stool burden suggestive of constipation.  Medication: D5W, LR 1000 mL  Disposition: Admitted to med/surg telemetry     Past Medical History  Past Medical History:   Diagnosis Date    Bacterial vaginosis     Dehydration 10/06/2023    GERD (gastroesophageal reflux disease)     Meningitis ([facility])     Multiple sclerosis (Multi)     Urinary tract infection        Surgical History  Past Surgical History:   Procedure Laterality Date    BACLOFEN PUMP IMPLANTATION      CHOLECYSTECTOMY      CYSTOSCOPY          Social History  She reports that she has never smoked. She has never used smokeless tobacco. No history on file for alcohol use and drug use.    Family History  Family History   Problem Relation Name Age of Onset    Heart attack Father      Heart disease Father      Hypertension Father      Multiple sclerosis Father      Heart disease Brother          Allergies  Bactrim [sulfamethoxazole-trimethoprim], Prednisone, Sulfa (sulfonamide antibiotics), and Levaquin [levofloxacin]    Review of Systems   Reason unable to perform ROS: Patient is non  "verbal; no family at bedside.        Physical Exam  Vitals reviewed.   Constitutional:       Appearance: She is normal weight. She is ill-appearing.   HENT:      Head: Normocephalic and atraumatic.      Right Ear: External ear normal.      Left Ear: External ear normal.      Nose: Nose normal.      Mouth/Throat:      Mouth: Mucous membranes are moist.      Pharynx: Oropharynx is clear.   Eyes:      Conjunctiva/sclera: Conjunctivae normal.      Pupils: Pupils are equal, round, and reactive to light.   Cardiovascular:      Rate and Rhythm: Normal rate. Rhythm irregular.      Pulses: Normal pulses.      Heart sounds: Normal heart sounds.   Pulmonary:      Effort: Pulmonary effort is normal.      Breath sounds: Rales present.   Abdominal:      General: Bowel sounds are normal.      Palpations: Abdomen is soft.   Musculoskeletal:      Cervical back: Normal range of motion and neck supple.      Right lower leg: No edema.      Left lower leg: Edema present.      Comments: Foot drop, and upper body contracture   Skin:     General: Skin is warm and dry.   Neurological:      Mental Status: She is alert. Mental status is at baseline.          Last Recorded Vitals  Blood pressure 128/77, pulse 75, temperature 36 °C (96.8 °F), temperature source Temporal, resp. rate 16, height 1.651 m (5' 5\"), weight 50.7 kg (111 lb 12.4 oz), SpO2 99%.    Relevant Results    Scheduled medications  amitriptyline, 10 mg, oral, Nightly  azithromycin, 500 mg, intravenous, q24h  biotin, 1,000 mcg, oral, Daily  bisacodyl, 10 mg, rectal, Daily  cefTRIAXone, 2 g, intravenous, q24h  docusate sodium, 100 mg, oral, BID  enoxaparin, 40 mg, subcutaneous, q24h  oxybutynin, 5 mg, oral, TID  perphenazine, 4 mg, oral, Nightly  polyethylene glycol, 17 g, oral, Daily  pregabalin, 200 mg, oral, BID  sennosides, 2 tablet, oral, BID      Continuous medications  potassium chloride in 0.9%NaCl, 100 mL/hr, Last Rate: 100 mL/hr (01/23/25 0100)      PRN medications  PRN " medications: acetaminophen, albuterol, benzocaine-menthol, benzonatate, calcium carbonate, lubricating eye drops, meclizine, melatonin, ondansetron, oxygen, simethicone, sodium chloride    Results for orders placed or performed during the hospital encounter of 01/22/25 (from the past 24 hours)   Urinalysis with Reflex Culture and Microscopic   Result Value Ref Range    Color, Urine Yellow Straw, Yellow    Appearance, Urine Clear Clear    Specific Gravity, Urine 1.013 1.005 - 1.035    pH, Urine 5.0 5.0, 5.5, 6.0, 6.5, 7.0, 7.5, 8.0    Protein, Urine NEGATIVE NEGATIVE mg/dL    Glucose, Urine NEGATIVE NEGATIVE mg/dL    Blood, Urine NEGATIVE NEGATIVE    Ketones, Urine 80 (2+) (A) NEGATIVE mg/dL    Bilirubin, Urine NEGATIVE NEGATIVE    Urobilinogen, Urine <2.0 <2.0 mg/dL    Nitrite, Urine NEGATIVE NEGATIVE    Leukocyte Esterase, Urine NEGATIVE NEGATIVE   Extra Urine Gray Tube   Result Value Ref Range    Extra Tube Hold for add-ons.    Sars-CoV-2 and Influenza A/B PCR   Result Value Ref Range    Flu A Result Not Detected Not Detected    Flu B Result Not Detected Not Detected    Coronavirus 2019, PCR Not Detected Not Detected   RSV PCR   Result Value Ref Range    RSV PCR Not Detected Not Detected   CBC with Differential   Result Value Ref Range    WBC 9.7 4.4 - 11.3 x10*3/uL    nRBC 0.0 0.0 - 0.0 /100 WBCs    RBC 3.12 (L) 4.00 - 5.20 x10*6/uL    Hemoglobin 9.4 (L) 12.0 - 16.0 g/dL    Hematocrit 28.6 (L) 36.0 - 46.0 %    MCV 92 80 - 100 fL    MCH 30.1 26.0 - 34.0 pg    MCHC 32.9 32.0 - 36.0 g/dL    RDW 14.2 11.5 - 14.5 %    Platelets 163 150 - 450 x10*3/uL    Neutrophils % 82.7 40.0 - 80.0 %    Immature Granulocytes %, Automated 1.1 (H) 0.0 - 0.9 %    Lymphocytes % 8.7 13.0 - 44.0 %    Monocytes % 7.4 2.0 - 10.0 %    Eosinophils % 0.0 0.0 - 6.0 %    Basophils % 0.1 0.0 - 2.0 %    Neutrophils Absolute 8.04 (H) 1.20 - 7.70 x10*3/uL    Immature Granulocytes Absolute, Automated 0.11 0.00 - 0.70 x10*3/uL    Lymphocytes Absolute  0.85 (L) 1.20 - 4.80 x10*3/uL    Monocytes Absolute 0.72 0.10 - 1.00 x10*3/uL    Eosinophils Absolute 0.00 0.00 - 0.70 x10*3/uL    Basophils Absolute 0.01 0.00 - 0.10 x10*3/uL   Comprehensive Metabolic Panel   Result Value Ref Range    Glucose 69 (L) 74 - 99 mg/dL    Sodium 134 (L) 136 - 145 mmol/L    Potassium 2.7 (LL) 3.5 - 5.3 mmol/L    Chloride 89 (L) 98 - 107 mmol/L    Bicarbonate 27 21 - 32 mmol/L    Anion Gap 21 (H) 10 - 20 mmol/L    Urea Nitrogen 17 6 - 23 mg/dL    Creatinine 0.42 (L) 0.50 - 1.05 mg/dL    eGFR >90 >60 mL/min/1.73m*2    Calcium 9.3 8.6 - 10.3 mg/dL    Albumin 4.0 3.4 - 5.0 g/dL    Alkaline Phosphatase 126 (H) 33 - 110 U/L    Total Protein 7.1 6.4 - 8.2 g/dL    AST 65 (H) 9 - 39 U/L    Bilirubin, Total 0.5 0.0 - 1.2 mg/dL    ALT 50 (H) 7 - 45 U/L   Troponin I, High Sensitivity   Result Value Ref Range    Troponin I, High Sensitivity 47 (H) 0 - 13 ng/L   Lactate   Result Value Ref Range    Lactate 1.0 0.4 - 2.0 mmol/L   Lipase   Result Value Ref Range    Lipase 10 9 - 82 U/L   Magnesium   Result Value Ref Range    Magnesium 1.60 1.60 - 2.40 mg/dL   Light Blue Top   Result Value Ref Range    Extra Tube Hold for add-ons.    Red Top   Result Value Ref Range    Extra Tube Hold for add-ons.    ECG 12 lead   Result Value Ref Range    Ventricular Rate 67 BPM    Atrial Rate 67 BPM    [place] Interval 150 ms    QRS Duration 98 ms    QT Interval 414 ms    QTC Calculation(Bazett) 437 ms    P Axis 66 degrees    R Axis 61 degrees    T Axis 55 degrees    QRS Count 11 beats    Q Onset 221 ms    P Onset 146 ms    P Offset 195 ms    T Offset 428 ms    QTC Fredericia 429 ms   ECG 12 lead   Result Value Ref Range    Ventricular Rate 67 BPM    Atrial Rate 67 BPM    NY Interval 154 ms    QRS Duration 98 ms    QT Interval 416 ms    QTC Calculation(Bazett) 439 ms    P Axis 70 degrees    R Axis 59 degrees    T Axis 52 degrees    QRS Count 11 beats    Q Onset 220 ms    P Onset 143 ms    P Offset 202 ms    T Offset 428 ms     QTC Fredericia 431 ms   Troponin I, High Sensitivity   Result Value Ref Range    Troponin I, High Sensitivity 42 (H) 0 - 13 ng/L   POCT GLUCOSE   Result Value Ref Range    POCT Glucose 166 (H) 74 - 99 mg/dL   CBC   Result Value Ref Range    WBC 8.1 4.4 - 11.3 x10*3/uL    nRBC 0.2 (H) 0.0 - 0.0 /100 WBCs    RBC 2.75 (L) 4.00 - 5.20 x10*6/uL    Hemoglobin 8.1 (L) 12.0 - 16.0 g/dL    Hematocrit 24.6 (L) 36.0 - 46.0 %    MCV 90 80 - 100 fL    MCH 29.5 26.0 - 34.0 pg    MCHC 32.9 32.0 - 36.0 g/dL    RDW 14.0 11.5 - 14.5 %    Platelets 153 150 - 450 x10*3/uL   Basic metabolic panel   Result Value Ref Range    Glucose 72 (L) 74 - 99 mg/dL    Sodium 136 136 - 145 mmol/L    Potassium 3.0 (L) 3.5 - 5.3 mmol/L    Chloride 97 (L) 98 - 107 mmol/L    Bicarbonate 29 21 - 32 mmol/L    Anion Gap 13 10 - 20 mmol/L    Urea Nitrogen 11 6 - 23 mg/dL    Creatinine 0.36 (L) 0.50 - 1.05 mg/dL    eGFR >90 >60 mL/min/1.73m*2    Calcium 8.5 (L) 8.6 - 10.3 mg/dL   Iron and TIBC   Result Value Ref Range    Iron      UIBC      TIBC      % Saturation     Ferritin   Result Value Ref Range    Ferritin               Assessment/Plan   Assessment & Plan  Hypokalemia    Pneumonia of both lungs due to infectious organism    Multiple sclerosis (Multi)    Dehydration    Normocytic anemia    Severe protein-calorie malnutrition (Multi)    Chronic GERD    Community acquired bacterial pneumonia    Other constipation    Acute respiratory failure with hypoxia (Multi)    ARIE (obstructive sleep apnea)    Elevated troponin level not due myocardial infarction      Community acquired bacterial pneumonia  ARIE  Complex sleep apnea  Acute respiratory failure with hypoxia  - CT chest: Extensive nodular, ground-glass and confluent parenchymal opacities with lower lobe predominance suggestive of multifocal infectious/inflammatory process.  - SpO2 55% on RA during sleep  - supplemental oxygen to keep SpO2 > 90%  - was placed on oxygen 5lpm via NC during the night  -  currently on RA  - patient has triology NIV at home  -  reports she will not wear the NIV here  - started ceftriaxone 2 g daily and azithromycin 500 mg daily; day 2  - started albuterol 2.5 mg q2h prn  - RT for bronchial hygiene  - blood cultures pending  - procalcitonin pending    Hypokalemia  - K 2.7 on admission; today K 3.0  - replaced with IV KCL  - continue 0.9% NS with 40 mEq at 100 ml/hr  - monitor BMP    Non MI troponin elevation  - serial troponin 47 > 42  - serial EKG; reviewed no ST segment changes    Constipation  - CT abd: Extensive colonic stool burden suggestive of constipation.   - started dulcolax 10 mg rectal daily  - started colace 100 mg BID, senokot 17.2 g BID, miralax 17 g daily    Normocytic anemia  Iron Deficiency  - iron 40, TIBC 229, % sat 17, Ferritin 255  - Hb 9.4 > 8.1 today  - monitor H&H  - will given IV venofer 300 mg today    Multiple Sclerosis, progressive  - continue baclofen pump 785 mcg/day  - continue amitriptyline 10 mg hs, perphenazine 4 mg hs    OAB  - continue oxybutynin 5 mg TID    DVT ppx  - started enoxaparin 40 mg daily    Code status: Full    Disposition: Patient requires more than 2 inpatient days.       SHILOH Nichols-CNP    Attending attestation note:    I went into patient room she is sleeping deep. I tried to wake her up with multiple louder good morning expression but only thing I can head is faint snoring. I shook feet but no response.    I saw all the patients on the round and went to see her again but she was still in deep sleep.    I just discussed the cause with Joanna Gar, reviewed the chart and confirm the findings. Patient vitals on monitor are stable. Plan as documented above in her note. I will try to assess patient again tomorrow at different time and hope to talk to her.  She is not in any visible respiratory distress.  She has been started on CeftriaXone for community acquired pneumonia.    Diego Longo MD

## 2025-01-23 NOTE — INDIVIDUALIZED OVERALL PLAN OF CARE NOTE
Patients  shared with RT on previoius shift that patient is diagnosed with central apnea and wears a ventilator at night at home sometimes. Nightshift RT asked that  please find out what the patients setting are on this unit and bring them in to share with the dayshift RT.  did come in and stated that patient has a difficult time wearing the unit and he does not think she will wear ours.  states that patient had a sleep study and once wore a CPAP and after seeing her neurologist, Mary Trinidad at [facility], it was felt that patient would be more appropriate on a ventilatory device and was ordered the Astral 100.  provided RT with all settings should it be necessary that patient be placed on one of our hospital units but felt that the patient would respond better to a nasal cannula for any desaturation.  states that patient does not RT will communicate this information with physician.

## 2025-01-23 NOTE — NURSING NOTE
Pt alert, eyes open, turns head but non-verbal and does not make effort to respond to questions.  at bedside, primary caretaker. Says he believes his wife is having an MS exacerbation, she is normally verbal and eats a regular diet, nondiabetic. But has not been eating or drinking, or taken her medications, since Sunday. Liquid emesis Monday 1x. Hard BM yesterday which  says is normal for her. Abdomen soft, non-distended. Chronically straight caths 6-7 times a day,  says for the past 25 years. Pt incontinent of urine of large amounts 2x this shift.    No wounds, sores, skin intact. Bilateral lower foot drop, toes mottled, purple, cool. Pt does not make an effort to move them when prompted. Bilateral arms contracted, stiff, also does not move them. Baclofen pump left side. Wears CPAP at night, has central sleep apnea. Dropped 50% spo2 on RA, 5L applied - increased to %, RT in room.  to bring CPAP today,

## 2025-01-23 NOTE — CARE PLAN
The patient's goals for the shift include      The clinical goals for the shift include Patient will tolerate IV fluids this shift    Patient was unable to drink from a straw or take bites of food this shift. Patient was unable to speak this shift,  states that this is not normal for her. Patient tolerated turning and reposition every two hours this shift. Patient did receive dulcolax suppository for constipation, which the  says is chronic for her. Patient was on room air all day. Patient had a temp of 99.3F and received tylenol suppository. Patient has had NS with K+40 running continuously this shift. Patient has voided on her own throughout the day, and has been bladder scanned intermediately to make sure there is no retention. Patient received IV iron replacements as well today. Patient has waffle boots on to protect her ankles. Patient baclofen pump information was brought in by the , copy sent to pharmacy and a copy was placed on the patients chart. Currently no needs at this time. Patient  is present with the patient.

## 2025-01-23 NOTE — PROGRESS NOTES
"Shameka Zaragoza is a 54 y.o. female on day 0 of admission presenting with a history of multiple sclerosis who presents with a 1 week history of fatigue, increased sleepiness and poor oral intake.   states he was unable to get her to take anything in orally today.  No coughing or choking.  No fever.  1 episode of vomiting.  Patient was initially evaluated Dr. Machado.  Please see his note for full details of the H&P.  Patient was signed out to me at change of shift pending test results.      Subjective   Is nonverbal and history obtained from .  He says she does not seem to be in any pain       Objective     Physical Exam  Vitals reviewed.   HENT:      Head: Normocephalic.      Mouth/Throat:      Mouth: Mucous membranes are dry.   Eyes:      Pupils: Pupils are equal, round, and reactive to light.   Cardiovascular:      Rate and Rhythm: Normal rate and regular rhythm.      Pulses: Normal pulses.      Heart sounds: Normal heart sounds.   Pulmonary:      Effort: Pulmonary effort is normal.      Breath sounds: Normal breath sounds.   Abdominal:      General: Abdomen is flat. Bowel sounds are normal.      Palpations: Abdomen is soft.   Skin:     General: Skin is warm and dry.   Neurological:      Mental Status: She is alert.         Last Recorded Vitals  Blood pressure 127/76, pulse 66, temperature 36.6 °C (97.8 °F), resp. rate 12, height 1.651 m (5' 5\"), weight 54.1 kg (119 lb 4.3 oz), SpO2 100%.  Intake/Output last 3 Shifts:  No intake/output data recorded.    Relevant Results               Labs Reviewed   CBC WITH AUTO DIFFERENTIAL - Abnormal       Result Value    WBC 9.7      nRBC 0.0      RBC 3.12 (*)     Hemoglobin 9.4 (*)     Hematocrit 28.6 (*)     MCV 92      MCH 30.1      MCHC 32.9      RDW 14.2      Platelets 163      Neutrophils % 82.7      Immature Granulocytes %, Automated 1.1 (*)     Lymphocytes % 8.7      Monocytes % 7.4      Eosinophils % 0.0      Basophils % 0.1      Neutrophils " Absolute 8.04 (*)     Immature Granulocytes Absolute, Automated 0.11      Lymphocytes Absolute 0.85 (*)     Monocytes Absolute 0.72      Eosinophils Absolute 0.00      Basophils Absolute 0.01     COMPREHENSIVE METABOLIC PANEL - Abnormal    Glucose 69 (*)     Sodium 134 (*)     Potassium 2.7 (*)     Chloride 89 (*)     Bicarbonate 27      Anion Gap 21 (*)     Urea Nitrogen 17      Creatinine 0.42 (*)     eGFR >90      Calcium 9.3      Albumin 4.0      Alkaline Phosphatase 126 (*)     Total Protein 7.1      AST 65 (*)     Bilirubin, Total 0.5      ALT 50 (*)    URINALYSIS WITH REFLEX CULTURE AND MICROSCOPIC - Abnormal    Color, Urine Yellow      Appearance, Urine Clear      Specific Gravity, Urine 1.013      pH, Urine 5.0      Protein, Urine NEGATIVE      Glucose, Urine NEGATIVE      Blood, Urine NEGATIVE      Ketones, Urine 80 (2+) (*)     Bilirubin, Urine NEGATIVE      Urobilinogen, Urine <2.0      Nitrite, Urine NEGATIVE      Leukocyte Esterase, Urine NEGATIVE     TROPONIN I, HIGH SENSITIVITY - Abnormal    Troponin I, High Sensitivity 47 (*)     Narrative:     Less than 99th percentile of normal range cutoff-  Female and children under 18 years old <14 ng/L; Male <21 ng/L: Negative  Repeat testing should be performed if clinically indicated.     Female and children under 18 years old 14-50 ng/L; Male 21-50 ng/L:  Consistent with possible cardiac damage and possible increased clinical   risk. Serial measurements may help to assess extent of myocardial damage.     >50 ng/L: Consistent with cardiac damage, increased clinical risk and  myocardial infarction. Serial measurements may help assess extent of   myocardial damage.      NOTE: Children less than 1 year old may have higher baseline troponin   levels and results should be interpreted in conjunction with the overall   clinical context.     NOTE: Troponin I testing is performed using a different   testing methodology at [facility] than at other    [facility]. Direct result comparisons should only   be made within the same method.   TROPONIN I, HIGH SENSITIVITY - Abnormal    Troponin I, High Sensitivity 42 (*)     Narrative:     Less than 99th percentile of normal range cutoff-  Female and children under 18 years old <14 ng/L; Male <21 ng/L: Negative  Repeat testing should be performed if clinically indicated.     Female and children under 18 years old 14-50 ng/L; Male 21-50 ng/L:  Consistent with possible cardiac damage and possible increased clinical   risk. Serial measurements may help to assess extent of myocardial damage.     >50 ng/L: Consistent with cardiac damage, increased clinical risk and  myocardial infarction. Serial measurements may help assess extent of   myocardial damage.      NOTE: Children less than 1 year old may have higher baseline troponin   levels and results should be interpreted in conjunction with the overall   clinical context.     NOTE: Troponin I testing is performed using a different   testing methodology at [facility] than at other   [facility]. Direct result comparisons should only   be made within the same method.   POCT GLUCOSE - Abnormal    POCT Glucose 166 (*)    SARS-COV-2 AND INFLUENZA A/B PCR - Normal    Flu A Result Not Detected      Flu B Result Not Detected      Coronavirus 2019, PCR Not Detected      Narrative:     This assay is an FDA-cleared, in vitro diagnostic nucleic acid amplification test for the qualitative detection and differentiation of SARS CoV-2/ Influenza A/B from nasopharyngeal specimens collected from individuals with signs and symptoms of respiratory tract infections, and has been validated for use at [facility]. Negative results do not preclude COVID-19/ Influenza A/B infections and should not be used as the sole basis for diagnosis, treatment, or other management decisions. Testing for SARS CoV-2 is recommended only for patients who meet current  clinical and/or epidemiological criteria defined by federal, state, or local public health directives.   LACTATE - Normal    Lactate 1.0      Narrative:     Venipuncture immediately after or during the administration of Metamizole may lead to falsely low results. Testing should be performed immediately prior to Metamizole dosing.   LIPASE - Normal    Lipase 10      Narrative:     Venipuncture immediately after or during the administration of Metamizole may lead to falsely low results. Testing should be performed immediately prior to Metamizole dosing.   MAGNESIUM - Normal    Magnesium 1.60     RSV PCR - Normal    RSV PCR Not Detected      Narrative:     This assay is an FDA-cleared, in vitro diagnostic nucleic acid amplification test for the detection of RSV from nasopharyngeal specimens, and has been validated for use at [facility]. Negative results do not preclude RSV infections, and should not be used as the sole basis for diagnosis, treatment, or other management decisions. If Influenza A/B and RSV PCR results are negative, testing for Parainfluenza virus, Adenovirus and Metapneumovirus is routinely performed for pediatric oncology and intensive care inpatients at [facility], and is available on other patients by placing an add-on request.       URINALYSIS WITH REFLEX CULTURE AND MICROSCOPIC    Narrative:     The following orders were created for panel order Urinalysis with Reflex Culture and Microscopic.  Procedure                               Abnormality         Status                     ---------                               -----------         ------                     Urinalysis with Reflex C...[[id number]]  Abnormal            Final result               Extra Urine Gray Tube[[id number]]                            In process                   Please view results for these tests on the individual orders.   EXTRA URINE GRAY TUBE     ED Medication Administration from 01/22/2025 1836 to  01/22/2025 2232         Date/Time Order Dose Route Action Action by     01/22/2025 1946 EST lactated Ringer's bolus 1,000 mL 1,000 mL intravenous New Bag Thierno, S     01/22/2025 1948 EST sodium chloride 0.9% infusion 125 mL/hr intravenous New Bag Thierno, S     01/22/2025 1959 EST potassium chloride 20 mEq in sterile water for injection 100 mL 20 mEq intravenous New Bag Thierno, S     01/22/2025 2018 EST dextrose 50 % injection 25 g 25 g intravenous Given Thierno, S     01/22/2025 2031 EST iohexol (OMNIPaque) 350 mg iodine/mL solution 75 mL 75 mL intravenous Given HEMALATHA Mckeon     01/22/2025 2123 EST lactated Ringer's bolus 1,000 mL 0 mL intravenous Stopped Thierno, S     01/22/2025 2230 EST cefTRIAXone (Rocephin) 2 g in dextrose (iso) IV 50 mL 2 g intravenous New Bag Thierno, S          CT chest abdomen pelvis w IV contrast   Final Result   Extensive nodular, ground-glass and confluent parenchymal opacities   with lower lobe predominance suggestive of multifocal   infectious/inflammatory process.        Extensive colonic stool burden suggestive of constipation.        Limited evaluation of the abdomen/pelvis.        MACRO:   None        Signed by: Nadeen Alvarez 1/22/2025 9:50 PM   Dictation workstation:   [id number]      XR chest 1 view   Final Result   No acute cardiopulmonary disease.        Signed by: Hubert Cox 1/22/2025 7:50 PM   Dictation workstation:   [id number]            EKG  1916 --twelve-lead EKG was obtained and read by me. This demonstrates normal sinus rhythm at 67, normal axes, normal intervals, no ectopy, no ischemia, no pericarditis. Compared to most recent prior EKG (10/10/23), normal sinus rhythm has replaced sinus tachycardia.  2009 --twelve-lead EKG was obtained and read by me. This demonstrates normal sinus rhythm with a rate of 67, normal intervals, normal axes, no ectopy, no ischemia, no pericarditis. There was no change compared to most recent prior EKG. arrival           Assessment/Plan    Assessment & Plan  Pneumonia of both lungs due to infectious organism, unspecified part of lung    Hypokalemia    Dehydration    This patient presents to the emergency department with the above history and physical.  Clinically the patient appears dehydrated.  In no distress.  Laboratory evaluation patient has normal lactate, no peripheral leukocytosis.  Chronic stable anemia.  Chemistry panel remarkable for glucose of 69 treated with IV dextrose.  Potassium low at 2.7 treated with IV potassium.  Urinalysis shows positive ketones but no evidence for infection.  COVID, influenza, RSV negative.  Initial troponin 40 7 repeat at 42 without any EKG changes.    Chest x-ray obtained and read by radiology demonstrating no acute cardiopulmonary disease.  CT scan chest, abdomen pelvis obtained and also read by radiology demonstrating Extensive patchy nodular groundglass parenchymal opacities, extensive colonic stool burden, normal aorta and IVC, normal retroperitoneum.  IV Rocephin and Zithromax ordered.    Patient will require hospitalization for further evaluation and treatment.  Case was discussed with Valentine Palomo MD on-call for the community hospitalist service including patient's past medical history, presenting signs symptoms, current clinical condition and test results.  She has graciously accept the patient for admission.       I spent 0 minutes in the critical care of this patient.      Kecia Quiñones MD

## 2025-01-23 NOTE — ED PROVIDER NOTES
Department of Emergency Medicine   ED  Provider Note  Admit Date/RoomTime: 1/22/2025  6:40 PM  [facility]                  History of Present Illness:   Shameka Zaragoza is a 54 y.o. female presenting to the ED for decreased oral intake, beginning 3 days ago on Sunday.  The complaint has been persistent, moderate in severity, and worsened by nothing.   states she has progressive MS.  Has not been wanting to drink or eat.  He says that she has had about a half a gallon of water total in the last 3 days.  Refusing to eat anything.  She had 1 episode of emesis.  No siva fever or chills.  No change in medications.      Review of Systems:   Pertinent positives and review of systems as noted above.  Remaining 10 review of systems is negative or noncontributory to today's episode of care.  Review of Systems       --------------------------------------------- PAST HISTORY ---------------------------------------------  Past Medical History:  has a past medical history of Dehydration (10/06/2023) and Multiple sclerosis (Multi).    Past Surgical History:  has no past surgical history on file.    Social History:  reports that she has never smoked. She has never used smokeless tobacco.    Family History: family history is not on file. Unless otherwise noted, family history is non contributory    Patient's Medications   New Prescriptions    No medications on file   Previous Medications    AMITRIPTYLINE (ELAVIL) 10 MG TABLET    Take 1 tablet (10 mg) by mouth once daily at bedtime.    BACLOFEN (LIORESAL) 2,000 MCG/ML INTRATHECAL INJECTION    680.9 mcg by intrathecal route.    BIOTIN 1 MG TABLET    Take 1 tablet (1,000 mcg) by mouth.    DOCUSATE SODIUM (COLACE) 100 MG CAPSULE    Take by mouth.    MECLIZINE (ANTIVERT) 25 MG TABLET    Take 1 tablet (25 mg) by mouth 3 times a day as needed for dizziness.    ONDANSETRON (ZOFRAN) 4 MG TABLET    Take 2 tablets (8 mg) by mouth every 8 hours if needed for nausea or  vomiting.    PERPHENAZINE-AMITRIPTYLINE 4-10 MG TABLET    Take 1 tablet by mouth once daily at bedtime.    PREGABALIN (LYRICA) 200 MG CAPSULE    Take 1 capsule (200 mg) by mouth 2 times a day.    SOLIFENACIN (VESICARE) 10 MG TABLET    Take 1 tablet (10 mg) by mouth once daily.   Modified Medications    No medications on file   Discontinued Medications    No medications on file      The patient’s home medications have been reviewed.    Allergies: Bactrim [sulfamethoxazole-trimethoprim], Prednisone, Sulfa (sulfonamide antibiotics), and Levaquin [levofloxacin]    -------------------------------------------------- RESULTS -------------------------------------------------  All laboratory and radiology results have been personally reviewed by myself   LABS:  Labs Reviewed   CBC WITH AUTO DIFFERENTIAL - Abnormal       Result Value    WBC 9.7      nRBC 0.0      RBC 3.12 (*)     Hemoglobin 9.4 (*)     Hematocrit 28.6 (*)     MCV 92      MCH 30.1      MCHC 32.9      RDW 14.2      Platelets 163      Neutrophils % 82.7      Immature Granulocytes %, Automated 1.1 (*)     Lymphocytes % 8.7      Monocytes % 7.4      Eosinophils % 0.0      Basophils % 0.1      Neutrophils Absolute 8.04 (*)     Immature Granulocytes Absolute, Automated 0.11      Lymphocytes Absolute 0.85 (*)     Monocytes Absolute 0.72      Eosinophils Absolute 0.00      Basophils Absolute 0.01     URINALYSIS WITH REFLEX CULTURE AND MICROSCOPIC - Abnormal    Color, Urine Yellow      Appearance, Urine Clear      Specific Gravity, Urine 1.013      pH, Urine 5.0      Protein, Urine NEGATIVE      Glucose, Urine NEGATIVE      Blood, Urine NEGATIVE      Ketones, Urine 80 (2+) (*)     Bilirubin, Urine NEGATIVE      Urobilinogen, Urine <2.0      Nitrite, Urine NEGATIVE      Leukocyte Esterase, Urine NEGATIVE     SARS-COV-2 AND INFLUENZA A/B PCR - Normal    Flu A Result Not Detected      Flu B Result Not Detected      Coronavirus 2019, PCR Not Detected      Narrative:      This assay is an FDA-cleared, in vitro diagnostic nucleic acid amplification test for the qualitative detection and differentiation of SARS CoV-2/ Influenza A/B from nasopharyngeal specimens collected from individuals with signs and symptoms of respiratory tract infections, and has been validated for use at [facility]. Negative results do not preclude COVID-19/ Influenza A/B infections and should not be used as the sole basis for diagnosis, treatment, or other management decisions. Testing for SARS CoV-2 is recommended only for patients who meet current clinical and/or epidemiological criteria defined by federal, state, or local public health directives.   LACTATE - Normal    Lactate 1.0      Narrative:     Venipuncture immediately after or during the administration of Metamizole may lead to falsely low results. Testing should be performed immediately prior to Metamizole dosing.   RSV PCR - Normal    RSV PCR Not Detected      Narrative:     This assay is an FDA-cleared, in vitro diagnostic nucleic acid amplification test for the detection of RSV from nasopharyngeal specimens, and has been validated for use at [facility]. Negative results do not preclude RSV infections, and should not be used as the sole basis for diagnosis, treatment, or other management decisions. If Influenza A/B and RSV PCR results are negative, testing for Parainfluenza virus, Adenovirus and Metapneumovirus is routinely performed for pediatric oncology and intensive care inpatients at [facility], and is available on other patients by placing an add-on request.       COMPREHENSIVE METABOLIC PANEL   URINALYSIS WITH REFLEX CULTURE AND MICROSCOPIC    Narrative:     The following orders were created for panel order Urinalysis with Reflex Culture and Microscopic.  Procedure                               Abnormality         Status                     ---------                               -----------          "------                     Urinalysis with Reflex C...[[id number]]  Abnormal            Final result               Extra Urine Gray Tube[[id number]]                            In process                   Please view results for these tests on the individual orders.   EXTRA URINE GRAY TUBE   TROPONIN I, HIGH SENSITIVITY   LIPASE   MAGNESIUM   INFLUENZA A AND B PCR         RADIOLOGY:  Interpreted by Radiologist.  XR chest 1 view    (Results Pending)       No results found for this or any previous visit (from the past 4464 hours).  ------------------------- NURSING NOTES AND VITALS REVIEWED ---------------------------   The nursing notes within the ED encounter and vital signs as below have been reviewed.   /78   Pulse 68   Temp 36.6 °C (97.8 °F)   Resp 16   Ht 1.651 m (5' 5\")   Wt 54.1 kg (119 lb 4.3 oz)   SpO2 100%   BMI 19.85 kg/m²   Oxygen Saturation Interpretation: Normal      ---------------------------------------------------PHYSICAL EXAM--------------------------------------  Physical Exam  Vitals and nursing note reviewed.   Constitutional:       General: She is not in acute distress.     Appearance: She is well-developed. She is not ill-appearing or toxic-appearing.      Comments: Patient appears chronically ill from her MS.  She is nonverbal for me.   HENT:      Head: Normocephalic and atraumatic.      Nose: Nose normal. No congestion or rhinorrhea.      Mouth/Throat:      Mouth: Mucous membranes are dry.      Pharynx: No oropharyngeal exudate or posterior oropharyngeal erythema.   Eyes:      General: No scleral icterus.     Extraocular Movements: Extraocular movements intact.      Conjunctiva/sclera: Conjunctivae normal.      Pupils: Pupils are equal, round, and reactive to light.   Cardiovascular:      Rate and Rhythm: Normal rate and regular rhythm.      Pulses: Normal pulses.      Heart sounds: Normal heart sounds. No murmur heard.  Pulmonary:      Effort: Pulmonary effort is normal. No " respiratory distress.      Breath sounds: Normal breath sounds. No wheezing, rhonchi or rales.   Abdominal:      General: There is no distension.      Palpations: Abdomen is soft.      Tenderness: There is no abdominal tenderness. There is no right CVA tenderness, left CVA tenderness, guarding or rebound.   Musculoskeletal:         General: No swelling, deformity or signs of injury. Normal range of motion.      Cervical back: Normal range of motion and neck supple. No rigidity or tenderness.      Right lower leg: No edema.      Left lower leg: No edema.   Lymphadenopathy:      Cervical: No cervical adenopathy.   Skin:     General: Skin is warm and dry.      Capillary Refill: Capillary refill takes less than 2 seconds.      Coloration: Skin is pale.      Findings: No bruising, erythema, lesion or rash.   Neurological:      Mental Status: She is alert.      Comments: Patient has progressive changes from her MS.  Patient essentially nonverbal.  She is not following any significant commands for me.  Patient has some flexion contracture deformities.   Psychiatric:         Mood and Affect: Mood normal.            Procedures  None  ------------------------------ ED COURSE/MEDICAL DECISION MAKING----------------------    Medical Decision Making:   Patient was seen and examined by me.  An IV is placed.  Multiple labs are ordered.    Patient is signed out the oncoming physician Dr. Kecia Quiñones at shift change.        Counseling:   The emergency provider has spoken with the    and discussed today’s results, in addition to providing specific details for the plan of care and counseling regarding the diagnosis and prognosis.  Questions are answered at this time and they are agreeable with the plan.      --------------------------------- IMPRESSION AND DISPOSITION ---------------------------------        IMPRESSION  No diagnosis found.    DISPOSITION  Disposition: Signed out at shift change to Dr. Kecia Quiñones at  2000  Patient condition is stable      Billing Provider Critical Care Time: 0 minutes     Darryl Machado DO  01/22/25 1947

## 2025-01-23 NOTE — PROGRESS NOTES
01/23/25 1542   Discharge Planning   Living Arrangements Spouse/significant other   Support Systems Spouse/significant other   Type of Residence Private residence   Expected Discharge Disposition Home   Stroke Family Assessment   Stroke Family Assessment Needed No   Intensity of Service   Intensity of Service 0-30 min     Care Transition Progress Note    Patient: Shameka Zaragoza    Diagnosis: Hypokalemia    Patient Concerns: Pt  met with LSW today. Reviewed questions of home aid needs today and provided some resource information today. 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     Patient Needs: Home Aid need.     Care Transition Social Worker explained discharge process today. MATTEO MA

## 2025-01-24 ENCOUNTER — APPOINTMENT (OUTPATIENT)
Dept: RADIOLOGY | Facility: HOSPITAL | Age: 55
End: 2025-01-24
Payer: MEDICARE

## 2025-01-24 LAB
ANION GAP SERPL CALC-SCNC: 17 MMOL/L (ref 10–20)
BUN SERPL-MCNC: 11 MG/DL (ref 6–23)
CALCIUM SERPL-MCNC: 8 MG/DL (ref 8.6–10.3)
CHLORIDE SERPL-SCNC: 109 MMOL/L (ref 98–107)
CO2 SERPL-SCNC: 24 MMOL/L (ref 21–32)
CREAT SERPL-MCNC: 0.35 MG/DL (ref 0.5–1.05)
EGFRCR SERPLBLD CKD-EPI 2021: >90 ML/MIN/1.73M*2
GLUCOSE SERPL-MCNC: 62 MG/DL (ref 74–99)
HOLD SPECIMEN: NORMAL
POTASSIUM SERPL-SCNC: 3.5 MMOL/L (ref 3.5–5.3)
PROCALCITONIN SERPL-MCNC: 0.25 NG/ML
SODIUM SERPL-SCNC: 146 MMOL/L (ref 136–145)

## 2025-01-24 PROCEDURE — 80048 BASIC METABOLIC PNL TOTAL CA: CPT | Mod: IPSPLIT | Performed by: NURSE PRACTITIONER

## 2025-01-24 PROCEDURE — 2500000004 HC RX 250 GENERAL PHARMACY W/ HCPCS (ALT 636 FOR OP/ED): Mod: IPSPLIT | Performed by: NURSE PRACTITIONER

## 2025-01-24 PROCEDURE — 2500000001 HC RX 250 WO HCPCS SELF ADMINISTERED DRUGS (ALT 637 FOR MEDICARE OP): Mod: IPSPLIT | Performed by: NURSE PRACTITIONER

## 2025-01-24 PROCEDURE — 71045 X-RAY EXAM CHEST 1 VIEW: CPT | Mod: IPSPLIT

## 2025-01-24 PROCEDURE — 2500000002 HC RX 250 W HCPCS SELF ADMINISTERED DRUGS (ALT 637 FOR MEDICARE OP, ALT 636 FOR OP/ED): Mod: IPSPLIT | Performed by: NURSE PRACTITIONER

## 2025-01-24 PROCEDURE — 70450 CT HEAD/BRAIN W/O DYE: CPT | Mod: IPSPLIT

## 2025-01-24 PROCEDURE — 51701 INSERT BLADDER CATHETER: CPT | Mod: IPSPLIT

## 2025-01-24 PROCEDURE — 2500000004 HC RX 250 GENERAL PHARMACY W/ HCPCS (ALT 636 FOR OP/ED): Mod: IPSPLIT | Performed by: STUDENT IN AN ORGANIZED HEALTH CARE EDUCATION/TRAINING PROGRAM

## 2025-01-24 PROCEDURE — 94760 N-INVAS EAR/PLS OXIMETRY 1: CPT | Mod: IPSPLIT

## 2025-01-24 PROCEDURE — 1100000001 HC PRIVATE ROOM DAILY: Mod: IPSPLIT

## 2025-01-24 PROCEDURE — 36415 COLL VENOUS BLD VENIPUNCTURE: CPT | Mod: IPSPLIT | Performed by: NURSE PRACTITIONER

## 2025-01-24 PROCEDURE — 2500000005 HC RX 250 GENERAL PHARMACY W/O HCPCS: Mod: IPSPLIT | Performed by: NURSE PRACTITIONER

## 2025-01-24 PROCEDURE — 99232 SBSQ HOSP IP/OBS MODERATE 35: CPT | Performed by: NURSE PRACTITIONER

## 2025-01-24 PROCEDURE — 71045 X-RAY EXAM CHEST 1 VIEW: CPT | Performed by: RADIOLOGY

## 2025-01-24 PROCEDURE — 2500000004 HC RX 250 GENERAL PHARMACY W/ HCPCS (ALT 636 FOR OP/ED): Mod: IPSPLIT

## 2025-01-24 PROCEDURE — 36415 COLL VENOUS BLD VENIPUNCTURE: CPT | Mod: IPSPLIT | Performed by: STUDENT IN AN ORGANIZED HEALTH CARE EDUCATION/TRAINING PROGRAM

## 2025-01-24 PROCEDURE — 74018 RADEX ABDOMEN 1 VIEW: CPT | Mod: IPSPLIT

## 2025-01-24 PROCEDURE — 70450 CT HEAD/BRAIN W/O DYE: CPT | Performed by: STUDENT IN AN ORGANIZED HEALTH CARE EDUCATION/TRAINING PROGRAM

## 2025-01-24 PROCEDURE — 74018 RADEX ABDOMEN 1 VIEW: CPT | Performed by: RADIOLOGY

## 2025-01-24 RX ORDER — SODIUM CHLORIDE 9 MG/ML
75 INJECTION, SOLUTION INTRAVENOUS CONTINUOUS
Status: DISCONTINUED | OUTPATIENT
Start: 2025-01-24 | End: 2025-01-25

## 2025-01-24 RX ORDER — LIDOCAINE HYDROCHLORIDE 20 MG/ML
1 JELLY TOPICAL ONCE
Status: COMPLETED | OUTPATIENT
Start: 2025-01-24 | End: 2025-01-24

## 2025-01-24 RX ORDER — POLYETHYLENE GLYCOL 3350 17 G/17G
17 POWDER, FOR SOLUTION ORAL DAILY
Status: DISCONTINUED | OUTPATIENT
Start: 2025-01-25 | End: 2025-01-28

## 2025-01-24 RX ORDER — PERPHENAZINE 4 MG/1
4 TABLET ORAL NIGHTLY
Status: DISCONTINUED | OUTPATIENT
Start: 2025-01-24 | End: 2025-01-28

## 2025-01-24 RX ORDER — SODIUM CHLORIDE 9 MG/ML
INJECTION, SOLUTION INTRAVENOUS
Status: COMPLETED
Start: 2025-01-24 | End: 2025-01-24

## 2025-01-24 RX ORDER — PREGABALIN 100 MG/1
200 CAPSULE ORAL 2 TIMES DAILY
Status: DISCONTINUED | OUTPATIENT
Start: 2025-01-24 | End: 2025-01-28

## 2025-01-24 RX ORDER — OXYBUTYNIN CHLORIDE 5 MG/1
5 TABLET ORAL 3 TIMES DAILY
Status: DISCONTINUED | OUTPATIENT
Start: 2025-01-24 | End: 2025-01-28

## 2025-01-24 RX ORDER — AMITRIPTYLINE HYDROCHLORIDE 10 MG/1
10 TABLET, FILM COATED ORAL NIGHTLY
Status: DISCONTINUED | OUTPATIENT
Start: 2025-01-24 | End: 2025-01-28

## 2025-01-24 RX ADMIN — AMITRIPTYLINE HYDROCHLORIDE 10 MG: 10 TABLET, FILM COATED ORAL at 21:33

## 2025-01-24 RX ADMIN — ENOXAPARIN SODIUM 40 MG: 100 INJECTION SUBCUTANEOUS at 21:30

## 2025-01-24 RX ADMIN — METHYLPREDNISOLONE SODIUM SUCCINATE 125 MG: 125 INJECTION, POWDER, FOR SOLUTION INTRAMUSCULAR; INTRAVENOUS at 13:03

## 2025-01-24 RX ADMIN — METHYLPREDNISOLONE SODIUM SUCCINATE 125 MG: 125 INJECTION, POWDER, FOR SOLUTION INTRAMUSCULAR; INTRAVENOUS at 18:41

## 2025-01-24 RX ADMIN — PERPHENAZINE 4 MG: 4 TABLET, FILM COATED ORAL at 21:29

## 2025-01-24 RX ADMIN — OXYBUTYNIN CHLORIDE 5 MG: 5 TABLET ORAL at 21:29

## 2025-01-24 RX ADMIN — AZITHROMYCIN MONOHYDRATE 500 MG: 500 INJECTION, POWDER, LYOPHILIZED, FOR SOLUTION INTRAVENOUS at 21:34

## 2025-01-24 RX ADMIN — PREGABALIN 200 MG: 100 CAPSULE ORAL at 21:29

## 2025-01-24 RX ADMIN — SENNOSIDES 17.2 MG: 8.6 TABLET, FILM COATED ORAL at 21:29

## 2025-01-24 RX ADMIN — SODIUM CHLORIDE 75 ML/HR: 9 INJECTION, SOLUTION INTRAVENOUS at 15:43

## 2025-01-24 RX ADMIN — CEFTRIAXONE 2 G: 2 INJECTION, SOLUTION INTRAVENOUS at 21:29

## 2025-01-24 RX ADMIN — BISACODYL 10 MG: 10 SUPPOSITORY RECTAL at 09:49

## 2025-01-24 RX ADMIN — LIDOCAINE HYDROCHLORIDE 1 APPLICATION: 20 JELLY TOPICAL at 13:03

## 2025-01-24 ASSESSMENT — COGNITIVE AND FUNCTIONAL STATUS - GENERAL
HELP NEEDED FOR BATHING: TOTAL
WALKING IN HOSPITAL ROOM: TOTAL
CLIMB 3 TO 5 STEPS WITH RAILING: TOTAL
MOVING TO AND FROM BED TO CHAIR: TOTAL
STANDING UP FROM CHAIR USING ARMS: TOTAL
CLIMB 3 TO 5 STEPS WITH RAILING: TOTAL
MOVING FROM LYING ON BACK TO SITTING ON SIDE OF FLAT BED WITH BEDRAILS: TOTAL
DRESSING REGULAR LOWER BODY CLOTHING: TOTAL
MOBILITY SCORE: 6
PERSONAL GROOMING: TOTAL
EATING MEALS: TOTAL
WALKING IN HOSPITAL ROOM: TOTAL
EATING MEALS: TOTAL
HELP NEEDED FOR BATHING: TOTAL
DRESSING REGULAR UPPER BODY CLOTHING: TOTAL
PERSONAL GROOMING: TOTAL
TOILETING: TOTAL
MOVING FROM LYING ON BACK TO SITTING ON SIDE OF FLAT BED WITH BEDRAILS: TOTAL
DRESSING REGULAR UPPER BODY CLOTHING: TOTAL
DRESSING REGULAR LOWER BODY CLOTHING: TOTAL
TURNING FROM BACK TO SIDE WHILE IN FLAT BAD: TOTAL
TURNING FROM BACK TO SIDE WHILE IN FLAT BAD: TOTAL
MOVING TO AND FROM BED TO CHAIR: TOTAL
MOBILITY SCORE: 6
STANDING UP FROM CHAIR USING ARMS: TOTAL
TOILETING: TOTAL
DAILY ACTIVITIY SCORE: 6
DAILY ACTIVITIY SCORE: 6

## 2025-01-24 ASSESSMENT — PAIN SCALES - PAIN ASSESSMENT IN ADVANCED DEMENTIA (PAINAD)
TOTALSCORE: 0
BODYLANGUAGE: RELAXED
TOTALSCORE: 0
CONSOLABILITY: NO NEED TO CONSOLE
CONSOLABILITY: NO NEED TO CONSOLE
BODYLANGUAGE: RELAXED
BREATHING: NORMAL
FACIALEXPRESSION: SMILING OR INEXPRESSIVE
BREATHING: NORMAL
TOTALSCORE: 0
FACIALEXPRESSION: SMILING OR INEXPRESSIVE
BREATHING: NORMAL
CONSOLABILITY: NO NEED TO CONSOLE
FACIALEXPRESSION: SMILING OR INEXPRESSIVE
BODYLANGUAGE: RELAXED

## 2025-01-24 NOTE — PROGRESS NOTES
Shameka Zaragoza is a 54 y.o. female on day 1 of admission presenting with Hypokalemia.      Subjective   Patient assessed at bedside; lying in bed. She is non verbal. She will open her eyes.  at bedside; discussed POC. He agreed to place and NGT for temporary feedings.       Objective     Last Recorded Vitals  /77 (BP Location: Right arm, Patient Position: Lying)   Pulse 87   Temp 36.5 °C (97.7 °F) (Temporal)   Resp 16   Wt 50.7 kg (111 lb 12.4 oz)   SpO2 95%   Intake/Output last 3 Shifts:    Intake/Output Summary (Last 24 hours) at 1/24/2025 1210  Last data filed at 1/24/2025 0756  Gross per 24 hour   Intake 2285 ml   Output 450 ml   Net 1835 ml       Admission Weight  Weight: 54.1 kg (119 lb 4.3 oz) (01/22/25 1841)    Daily Weight  01/23/25 : 50.7 kg (111 lb 12.4 oz)    Image Results  ECG 12 lead  Normal sinus rhythm  Normal ECG  When compared with ECG of 22-JAN-2025 19:16, (unconfirmed)  No significant change was found  ECG 12 lead  Normal sinus rhythm  Normal ECG  When compared with ECG of 10-OCT-2023 01:46,  Vent. rate has decreased BY  39 BPM      Physical Exam  Vitals reviewed.   Constitutional:       Appearance: She is normal weight. She is ill-appearing.   HENT:      Head: Normocephalic and atraumatic.      Right Ear: External ear normal.      Left Ear: External ear normal.      Nose: Nose normal.      Mouth/Throat:      Mouth: Mucous membranes are moist.      Pharynx: Oropharynx is clear.   Eyes:      Conjunctiva/sclera: Conjunctivae normal.      Pupils: Pupils are equal, round, and reactive to light.   Cardiovascular:      Rate and Rhythm: Normal rate. Rhythm irregular.      Pulses: Normal pulses.      Heart sounds: Normal heart sounds.   Pulmonary:      Effort: Pulmonary effort is normal.      Breath sounds: Rales present.   Abdominal:      General: Bowel sounds are normal.      Palpations: Abdomen is soft.   Musculoskeletal:      Cervical back: Normal range of motion and neck  supple.      Right lower leg: No edema.      Left lower leg: Edema present.      Comments: Foot drop, and upper body contracture   Skin:     General: Skin is warm and dry.   Neurological:      Mental Status: She is alert. Mental status is at baseline.       Relevant Results    Scheduled medications  amitriptyline, 10 mg, oral, Nightly  azithromycin, 500 mg, intravenous, q24h  bisacodyl, 10 mg, rectal, Daily  cefTRIAXone, 2 g, intravenous, q24h  docusate sodium, 100 mg, oral, BID  enoxaparin, 40 mg, subcutaneous, q24h  lidocaine, 1 Application, Topical, Once  oxybutynin, 5 mg, oral, TID  PATIENT OWN PUMP baclofen (Lioresal) 633.1 mcg/day PATIENT SUPPLIED, , pump - intrathecal, Continuous Pump  perphenazine, 4 mg, oral, Nightly  polyethylene glycol, 17 g, oral, Daily  pregabalin, 200 mg, oral, BID  sennosides, 2 tablet, oral, BID      Continuous medications  potassium chloride in 0.9%NaCl, 100 mL/hr, Last Rate: 100 mL/hr (01/23/25 2108)      PRN medications  PRN medications: acetaminophen, albuterol, benzocaine-menthol, benzonatate, calcium carbonate, lubricating eye drops, meclizine, melatonin, ondansetron, oxygen, simethicone, sodium chloride    Results for orders placed or performed during the hospital encounter of 01/22/25 (from the past 24 hours)   Blood Culture    Specimen: Peripheral Venipuncture; Blood culture   Result Value Ref Range    Blood Culture Loaded on Instrument - Culture in progress    Blood Culture    Specimen: Peripheral Venipuncture; Blood culture   Result Value Ref Range    Blood Culture Loaded on Instrument - Culture in progress    Procalcitonin   Result Value Ref Range    Procalcitonin 0.25 (H) <=0.07 ng/mL   Basic metabolic panel   Result Value Ref Range    Glucose 62 (L) 74 - 99 mg/dL    Sodium 146 (H) 136 - 145 mmol/L    Potassium 3.5 3.5 - 5.3 mmol/L    Chloride 109 (H) 98 - 107 mmol/L    Bicarbonate 24 21 - 32 mmol/L    Anion Gap 17 10 - 20 mmol/L    Urea Nitrogen 11 6 - 23 mg/dL     Creatinine 0.35 (L) 0.50 - 1.05 mg/dL    eGFR >90 >60 mL/min/1.73m*2    Calcium 8.0 (L) 8.6 - 10.3 mg/dL                   Assessment/Plan        Assessment & Plan  Hypokalemia    Pneumonia of both lungs due to infectious organism    Multiple sclerosis (Multi)    Dehydration    Normocytic anemia    Severe protein-calorie malnutrition (Multi)    Chronic GERD    Community acquired bacterial pneumonia    Other constipation    Acute respiratory failure with hypoxia (Multi)    ARIE (obstructive sleep apnea)    Elevated troponin level not due myocardial infarction    Pneumonia of both lungs due to infectious organism, unspecified part of lung    Community acquired bacterial pneumonia  ARIE  Complex sleep apnea  Acute respiratory failure with hypoxia  - CT chest: Extensive nodular, ground-glass and confluent parenchymal opacities with lower lobe predominance suggestive of multifocal infectious/inflammatory process.  - SpO2 55% on RA during sleep  - supplemental oxygen to keep SpO2 > 90%  - was placed on oxygen 5lpm via NC during the night  - currently on RA  - patient has triology NIV at home  -  reports she will not wear the NIV here  - continue ceftriaxone 2 g daily and azithromycin 500 mg daily; day 3  - continue albuterol 2.5 mg q2h prn  - RT for bronchial hygiene  - blood cultures pending  - procalcitonin pending     Hypokalemia  - K 2.7 on admission; today K 3.5  - replaced with IV KCL  - discontinue 0.9% NS with 40 mEq at 100 ml/hr  - monitor BMP     Non MI troponin elevation  - serial troponin 47 > 42  - serial EKG; reviewed no ST segment changes     Constipation  - CT abd: Extensive colonic stool burden suggestive of constipation.   - continue dulcolax 10 mg rectal daily  - continue colace 100 mg BID, senokot 17.2 g BID, miralax 17 g daily    Moderate protein calorie malnutrition  - dietician consult  - will place NGT today  - start TF per dietician's recommendations     Normocytic anemia  Iron Deficiency  -  iron 40, TIBC 229, % sat 17, Ferritin 255  - Hb 9.4 > 8.1 today  - monitor H&H  - will given IV venofer 300 mg today     Multiple Sclerosis, progressive  - continue baclofen pump 785 mcg/day  - continue amitriptyline 10 mg hs, perphenazine 4 mg hs  - started solumedrol 125 mg q6h     OAB  - continue oxybutynin 5 mg TID     DVT ppx  - continue enoxaparin 40 mg daily     Code status: Full     Disposition: Patient requires more than 2 inpatient days.              SHILOH Nichols-CNP    Attending note:  I examined the patient in the morning.  She is non verbal so I cannot take history but reviewed the information above and agree with the assessment and plan.    Diego Longo MD

## 2025-01-24 NOTE — CARE PLAN
The patient's goals for the shift include  : rest    The clinical goals for the shift include Pt will have no complaints of pain throughout shift      Patient asleep throughout shift. NG tube was placed by previous RN for feedings/medications.  at bedside. Son concerned of patient's orientation. LYNNETTE Gar NP notified and ordered a head CT. Q2h turns provided. VSS on RA. No s/s of pain.

## 2025-01-24 NOTE — CARE PLAN
The patient's goals for the shift include      The clinical goals for the shift include patient will tolerate IV fluids and show no signs of pain this shift    Patient tolerated iv fluids and antibiotics well over night. Displays no signs of pain. Call light within reach. Will continue with poc.

## 2025-01-24 NOTE — CONSULTS
"Nutrition Initial Assessment:   Nutrition Assessment    Reason for Assessment: Provider consult order, Admission nursing screening (poor intake; assessment / recommendation)    Patient is a 54 y.o. female presented to the ED for decreased oral intake. Pt with progressive multiple sclerosis and has not been wanting to eat or drink since 1/19/25.  is concerned she is becoming dehydrated. She had 1 episode of emesis. Admitted for hypokalemia and pneumonia of both lungs.     PMH: GERD, MS, UTI    Nutrition History:  Energy Intake: Poor < 50 %  Pain affecting nutrition status: N/A  Food and Nutrient History: Pt visited in room,  at bedside.  is pt's full-time caregiver.  states pt was eating well until ~1 week ago when she quit eating and drinking. States she was eating pizza and meatball subs, slowly, but without difficulty, no difficulties swallowing per . Dahiana Gar, CNP at bedside at time of visit. Plans for a few days enteral nutrition via NG tube. Future PEG tube discussed with  who states, they are not ready for PEG tube at this time.  thinks once pneumonia is treated, pt's intake will improve.     Anthropometrics:  Height: 165.1 cm (5' 5\")   Weight: 50.7 kg (111 lb 12.4 oz)   BMI (Calculated): 18.6  IBW/kg (Dietitian Calculated): 56 kg  Percent of IBW: 91 %     Weight History:   Wt Readings from Last 10 Encounters:   01/23/25 50.7 kg (111 lb 12.4 oz)   10/18/23 59.6 kg (131 lb 6.3 oz)   10/09/23 58.5 kg (128 lb 15.5 oz)      Weight Change %:  Weight History / % Weight Change: 01/23/25 - 50.7 kg; 10/18/23 - 59.6 kg (limited wt history 2/2 telehealth visits)    Nutrition Focused Physical Exam Findings:    Subcutaneous Fat Loss:   Defer Subcutaneous Fat Loss Assessment: Defer all  Defer All Reason: non-verbal at time of visit; progressive MS with atrophy  Muscle Wasting:  Defer Muscle Wasting Assessment: Defer all  Defer All Reason: progressive MS with " atrophy  Edema:   none  Physical Findings:  Hair: Negative  Skin: Negative (intact)  Digestive System Findings: Constipation (resolving)    Nutrition Significant Labs:  BMP Trend:   Results from last 7 days   Lab Units 01/24/25  1101 01/23/25  0604 01/22/25 1910   GLUCOSE mg/dL 62* 72* 69*   CALCIUM mg/dL 8.0* 8.5* 9.3   SODIUM mmol/L 146* 136 134*   POTASSIUM mmol/L 3.5 3.0* 2.7*   CO2 mmol/L 24 29 27   CHLORIDE mmol/L 109* 97* 89*   BUN mg/dL 11 11 17   CREATININE mg/dL 0.35* 0.36* 0.42*    , Renal Lab Trend:   Results from last 7 days   Lab Units 01/24/25  1101 01/23/25  0604 01/22/25 1910   POTASSIUM mmol/L 3.5 3.0* 2.7*   SODIUM mmol/L 146* 136 134*   MAGNESIUM mg/dL  --   --  1.60   EGFR mL/min/1.73m*2 >90 >90 >90   BUN mg/dL 11 11 17   CREATININE mg/dL 0.35* 0.36* 0.42*      Nutrition Specific Medications:  amitriptyline, 10 mg, oral, Nightly  bisacodyl, 10 mg, rectal, Daily  docusate sodium, 100 mg, oral, BID  oxybutynin, 5 mg, oral, TID  PATIENT OWN PUMP baclofen (Lioresal) 633.1 mcg/day PATIENT SUPPLIED, , pump - intrathecal, Continuous Pump  perphenazine, 4 mg, oral, Nightly  polyethylene glycol, 17 g, oral, Daily  pregabalin, 200 mg, oral, BID  sennosides, 2 tablet, oral, BID    Continuous medications  sodium chloride 0.9%, 75 mL/hr    I/O:   Last BM Date: 01/21/25 (per patient ); Stool Appearance: Other (Comment) (black specks mixed with enema) (01/23/25 2100)    Dietary Orders (From admission, onward)       Start     Ordered    01/23/25 0116  May Participate in Room Service With Assistance  ( ROOM SERVICE MAY PARTICIPATE WITH ASSISTANCE)  Once        Question:  .  Answer:  Yes    01/23/25 0115 01/22/25 2259  Adult diet Regular  Diet effective now        Question:  Diet type  Answer:  Regular    01/22/25 2258                   Estimated Needs:   Total Energy Estimated Needs in 24 hours (kCal): 1400 kCal (9992-2931 Kcal/day)  Method for Estimating Needs: 25-30 Kcal/kg  Total Protein  Estimated Needs in 24 Hours (g): 55 g (50-60)  Method for Estimating 24 Hour Protein Needs: 1.0-1.2 gm/kg  Total Fluid Estimated Needs in 24 Hours (mL): 1750 mL (1291-9695 mL/day)  Method for Estimating 24 Hour Fluid Needs: 30-35 mL/Kg        Nutrition Diagnosis   Nutrition Diagnosis  Patient has Nutrition Diagnosis: Yes  Diagnosis Status (1): New  Nutrition Diagnosis 1: Inadequate protein energy intake  Related to (1): Poor PO intake  As Evidenced by (1): Poor intake reported for ~1 week prior to admission; No intake since time of admission; Low BMI 18.6; Hx progressive MS with new diagnosis of pneumonia     Nutrition Interventions/Recommendations   Nutrition prescription for enteral nutrition    Nutrition Recommendations:  Individualized Nutrition Prescription Provided for : Jevity 1.5 - start at 25 mL/hour, advance 10 mL as tolerated, to goal rate of 40 mL/hour; Free water flush of 250 mL 4x/day (goal rate = 1440 Kcal, 61 gm protein, 730 mL free water. Free water from formula and flushes = 1730 mL total water/day)    Nutrition Interventions/Goals:   Interventions: Enteral intake  Enteral Intake: Insert enteral feeding tube, Management of flushing of feeding tube, Management of volume of enteral nutrition  Goal: initiate enteral nutrition, advance to goal rate within 48 hours of initiation  Coordination of Care with Providers: Nursing, Provider  Goal: discuss daily care    Education Documentation  Nutrition Related Education, taught by Yi Lentz RD at 1/24/2025 12:51 PM.  Learner: Significant Other  Readiness: Acceptance  Method: Explanation  Response: Verbalizes Understanding  Comment:  educated on enteral nutrition via NG tube            Nutrition Monitoring and Evaluation   Food/Nutrient Related History Monitoring  Monitoring and Evaluation Plan: Enteral and parenteral nutrition intake determination  Enteral and Parenteral Nutrition Intake Determination: Enteral nutrition intake - Tolerate TF at  goal rate, Enteral nutrition intake - To meet > 75% estimated energy needs    Anthropometric Measurements  Monitoring and Evaluation Plan: Body weight  Body Weight: Body weight - Maintain stable weight    Biochemical Data, Medical Tests and Procedures  Monitoring and Evaluation Plan: Electrolyte/renal panel  Electrolyte and Renal Panel: Electrolytes within normal limits, Sodium    Physical Exam Findings  Monitoring and Evaluation Plan: Skin  Skin: Other (Comment)  Other: maintain skin integrity    Goal Status: New goal(s) identified    Time Spent (min): 90 minutes

## 2025-01-24 NOTE — PROGRESS NOTES
01/24/25 1318   Discharge Planning   Living Arrangements Spouse/significant other   Support Systems Spouse/significant other;Family members   Assistance Needed Community resources for respite care.   Type of Residence Private residence   Expected Discharge Disposition Home   Stroke Family Assessment   Stroke Family Assessment Needed No   Intensity of Service   Intensity of Service 0-30 min     Care Transition Progress Note 01/24/25    Patient: Shameka Zaragoza    Diagnosis: Hypokalemia    Patient Concerns: Met with pt  and his mother and patient today. Pt  provided some community resources for agency and options for help with medical insurance. Pt and LSW discussed home needs and offer of hospital bed if he would like one for home for any needs for future or current.  reports that he is agreeable to bed, but would like to see if pt will heal better and improve before ordering one. Explained option for  to look at [facility] to have help with home respite services as well.     Patient Needs: Pt  would like respite care option.     Care Transition Social Worker explained discharge process and reviewed Medicare Rights and had patient  sign the Medicare Rights today and provided copy to the patient. MATTEO MA

## 2025-01-25 LAB
ANION GAP SERPL CALC-SCNC: 12 MMOL/L (ref 10–20)
BUN SERPL-MCNC: 20 MG/DL (ref 6–23)
CALCIUM SERPL-MCNC: 7.9 MG/DL (ref 8.6–10.3)
CHLORIDE SERPL-SCNC: 105 MMOL/L (ref 98–107)
CO2 SERPL-SCNC: 27 MMOL/L (ref 21–32)
CREAT SERPL-MCNC: 0.41 MG/DL (ref 0.5–1.05)
EGFRCR SERPLBLD CKD-EPI 2021: >90 ML/MIN/1.73M*2
ERYTHROCYTE [DISTWIDTH] IN BLOOD BY AUTOMATED COUNT: 14.6 % (ref 11.5–14.5)
GLUCOSE SERPL-MCNC: 283 MG/DL (ref 74–99)
HCT VFR BLD AUTO: 25.8 % (ref 36–46)
HGB BLD-MCNC: 8.2 G/DL (ref 12–16)
MCH RBC QN AUTO: 29.3 PG (ref 26–34)
MCHC RBC AUTO-ENTMCNC: 31.8 G/DL (ref 32–36)
MCV RBC AUTO: 92 FL (ref 80–100)
NRBC BLD-RTO: 0.4 /100 WBCS (ref 0–0)
PLATELET # BLD AUTO: 247 X10*3/UL (ref 150–450)
POTASSIUM SERPL-SCNC: 3.2 MMOL/L (ref 3.5–5.3)
RBC # BLD AUTO: 2.8 X10*6/UL (ref 4–5.2)
SODIUM SERPL-SCNC: 141 MMOL/L (ref 136–145)
WBC # BLD AUTO: 8 X10*3/UL (ref 4.4–11.3)

## 2025-01-25 PROCEDURE — 2500000004 HC RX 250 GENERAL PHARMACY W/ HCPCS (ALT 636 FOR OP/ED): Mod: IPSPLIT | Performed by: STUDENT IN AN ORGANIZED HEALTH CARE EDUCATION/TRAINING PROGRAM

## 2025-01-25 PROCEDURE — 51701 INSERT BLADDER CATHETER: CPT | Mod: IPSPLIT

## 2025-01-25 PROCEDURE — 2500000001 HC RX 250 WO HCPCS SELF ADMINISTERED DRUGS (ALT 637 FOR MEDICARE OP): Mod: IPSPLIT | Performed by: NURSE PRACTITIONER

## 2025-01-25 PROCEDURE — 94760 N-INVAS EAR/PLS OXIMETRY 1: CPT | Mod: IPSPLIT

## 2025-01-25 PROCEDURE — 2500000004 HC RX 250 GENERAL PHARMACY W/ HCPCS (ALT 636 FOR OP/ED): Mod: IPSPLIT | Performed by: NURSE PRACTITIONER

## 2025-01-25 PROCEDURE — 80048 BASIC METABOLIC PNL TOTAL CA: CPT | Mod: IPSPLIT | Performed by: NURSE PRACTITIONER

## 2025-01-25 PROCEDURE — 85027 COMPLETE CBC AUTOMATED: CPT | Mod: IPSPLIT | Performed by: NURSE PRACTITIONER

## 2025-01-25 PROCEDURE — 99232 SBSQ HOSP IP/OBS MODERATE 35: CPT | Performed by: NURSE PRACTITIONER

## 2025-01-25 PROCEDURE — 2500000001 HC RX 250 WO HCPCS SELF ADMINISTERED DRUGS (ALT 637 FOR MEDICARE OP): Mod: IPSPLIT | Performed by: STUDENT IN AN ORGANIZED HEALTH CARE EDUCATION/TRAINING PROGRAM

## 2025-01-25 PROCEDURE — 36415 COLL VENOUS BLD VENIPUNCTURE: CPT | Mod: IPSPLIT | Performed by: NURSE PRACTITIONER

## 2025-01-25 PROCEDURE — 2500000002 HC RX 250 W HCPCS SELF ADMINISTERED DRUGS (ALT 637 FOR MEDICARE OP, ALT 636 FOR OP/ED): Mod: IPSPLIT | Performed by: NURSE PRACTITIONER

## 2025-01-25 PROCEDURE — 1100000001 HC PRIVATE ROOM DAILY: Mod: IPSPLIT

## 2025-01-25 RX ORDER — POTASSIUM CHLORIDE 1.5 G/1.58G
40 POWDER, FOR SOLUTION ORAL 2 TIMES DAILY
Status: COMPLETED | OUTPATIENT
Start: 2025-01-25 | End: 2025-01-25

## 2025-01-25 RX ORDER — FERROUS SULFATE 325(65) MG
65 TABLET ORAL
Status: DISCONTINUED | OUTPATIENT
Start: 2025-01-26 | End: 2025-01-29 | Stop reason: HOSPADM

## 2025-01-25 RX ORDER — FERROUS SULFATE 15 MG/ML
60 DROPS ORAL DAILY
Status: DISCONTINUED | OUTPATIENT
Start: 2025-01-25 | End: 2025-01-25 | Stop reason: RX

## 2025-01-25 RX ORDER — ACETAMINOPHEN 325 MG/1
650 TABLET ORAL EVERY 6 HOURS PRN
Status: DISCONTINUED | OUTPATIENT
Start: 2025-01-25 | End: 2025-01-28

## 2025-01-25 RX ORDER — ASCORBIC ACID 500 MG
500 TABLET ORAL DAILY
Status: DISCONTINUED | OUTPATIENT
Start: 2025-01-25 | End: 2025-01-28

## 2025-01-25 RX ORDER — FERROUS SULFATE 325(65) MG
65 TABLET ORAL ONCE
Status: COMPLETED | OUTPATIENT
Start: 2025-01-25 | End: 2025-01-25

## 2025-01-25 RX ORDER — ACETAMINOPHEN 650 MG/1
650 SUPPOSITORY RECTAL EVERY 6 HOURS PRN
Status: DISCONTINUED | OUTPATIENT
Start: 2025-01-25 | End: 2025-01-28

## 2025-01-25 RX ADMIN — METHYLPREDNISOLONE SODIUM SUCCINATE 125 MG: 125 INJECTION, POWDER, FOR SOLUTION INTRAMUSCULAR; INTRAVENOUS at 01:13

## 2025-01-25 RX ADMIN — BISACODYL 10 MG: 10 SUPPOSITORY RECTAL at 09:59

## 2025-01-25 RX ADMIN — METHYLPREDNISOLONE SODIUM SUCCINATE 125 MG: 125 INJECTION, POWDER, FOR SOLUTION INTRAMUSCULAR; INTRAVENOUS at 23:46

## 2025-01-25 RX ADMIN — POLYETHYLENE GLYCOL 3350 17 G: 17 POWDER, FOR SOLUTION ORAL at 09:59

## 2025-01-25 RX ADMIN — METHYLPREDNISOLONE SODIUM SUCCINATE 125 MG: 125 INJECTION, POWDER, FOR SOLUTION INTRAMUSCULAR; INTRAVENOUS at 18:08

## 2025-01-25 RX ADMIN — POTASSIUM CHLORIDE 40 MEQ: 1.5 POWDER, FOR SOLUTION ORAL at 20:53

## 2025-01-25 RX ADMIN — SODIUM CHLORIDE 75 ML/HR: 9 INJECTION, SOLUTION INTRAVENOUS at 06:32

## 2025-01-25 RX ADMIN — CEFTRIAXONE 2 G: 2 INJECTION, SOLUTION INTRAVENOUS at 20:47

## 2025-01-25 RX ADMIN — OXYBUTYNIN CHLORIDE 5 MG: 5 TABLET ORAL at 09:59

## 2025-01-25 RX ADMIN — SENNOSIDES 17.2 MG: 8.6 TABLET, FILM COATED ORAL at 09:59

## 2025-01-25 RX ADMIN — SENNOSIDES 17.2 MG: 8.6 TABLET, FILM COATED ORAL at 20:53

## 2025-01-25 RX ADMIN — OXYBUTYNIN CHLORIDE 5 MG: 5 TABLET ORAL at 16:04

## 2025-01-25 RX ADMIN — METHYLPREDNISOLONE SODIUM SUCCINATE 125 MG: 125 INJECTION, POWDER, FOR SOLUTION INTRAMUSCULAR; INTRAVENOUS at 12:29

## 2025-01-25 RX ADMIN — FERROUS SULFATE TAB 325 MG (65 MG ELEMENTAL FE) 325 MG: 325 (65 FE) TAB at 12:28

## 2025-01-25 RX ADMIN — METHYLPREDNISOLONE SODIUM SUCCINATE 125 MG: 125 INJECTION, POWDER, FOR SOLUTION INTRAMUSCULAR; INTRAVENOUS at 05:52

## 2025-01-25 RX ADMIN — AMITRIPTYLINE HYDROCHLORIDE 10 MG: 10 TABLET, FILM COATED ORAL at 20:53

## 2025-01-25 RX ADMIN — PREGABALIN 200 MG: 100 CAPSULE ORAL at 20:53

## 2025-01-25 RX ADMIN — PERPHENAZINE 4 MG: 4 TABLET, FILM COATED ORAL at 20:53

## 2025-01-25 RX ADMIN — POTASSIUM CHLORIDE 40 MEQ: 1.5 POWDER, FOR SOLUTION ORAL at 09:59

## 2025-01-25 RX ADMIN — AZITHROMYCIN MONOHYDRATE 500 MG: 500 INJECTION, POWDER, LYOPHILIZED, FOR SOLUTION INTRAVENOUS at 21:18

## 2025-01-25 RX ADMIN — ENOXAPARIN SODIUM 40 MG: 100 INJECTION SUBCUTANEOUS at 20:53

## 2025-01-25 RX ADMIN — ACETAMINOPHEN 650 MG: 325 TABLET ORAL at 21:07

## 2025-01-25 RX ADMIN — OXYBUTYNIN CHLORIDE 5 MG: 5 TABLET ORAL at 20:53

## 2025-01-25 RX ADMIN — OXYCODONE HYDROCHLORIDE AND ACETAMINOPHEN 500 MG: 500 TABLET ORAL at 12:28

## 2025-01-25 RX ADMIN — PREGABALIN 200 MG: 100 CAPSULE ORAL at 09:59

## 2025-01-25 ASSESSMENT — COGNITIVE AND FUNCTIONAL STATUS - GENERAL
DRESSING REGULAR UPPER BODY CLOTHING: TOTAL
EATING MEALS: TOTAL
PERSONAL GROOMING: TOTAL
DAILY ACTIVITIY SCORE: 6
TOILETING: TOTAL
CLIMB 3 TO 5 STEPS WITH RAILING: TOTAL
WALKING IN HOSPITAL ROOM: TOTAL
MOVING FROM LYING ON BACK TO SITTING ON SIDE OF FLAT BED WITH BEDRAILS: TOTAL
TURNING FROM BACK TO SIDE WHILE IN FLAT BAD: TOTAL
HELP NEEDED FOR BATHING: TOTAL
STANDING UP FROM CHAIR USING ARMS: TOTAL
MOBILITY SCORE: 6
DRESSING REGULAR LOWER BODY CLOTHING: TOTAL
MOVING TO AND FROM BED TO CHAIR: TOTAL

## 2025-01-25 ASSESSMENT — PAIN SCALES - GENERAL
PAINLEVEL_OUTOF10: 0 - NO PAIN
PAINLEVEL_OUTOF10: 3

## 2025-01-25 ASSESSMENT — PAIN - FUNCTIONAL ASSESSMENT: PAIN_FUNCTIONAL_ASSESSMENT: 0-10

## 2025-01-25 NOTE — PROGRESS NOTES
Shameka Zaragoza is a 54 y.o. female on day 2 of admission presenting with Hypokalemia.      Subjective   Patient assessed at bedside; lying in semi-brandon position. NGT in place and TF running. She is more awake today. She opened her eyes to her name; able to follow commands, and nodded her head to yes and no questions. She even smiled.       Objective     Last Recorded Vitals  /68 (BP Location: Right arm, Patient Position: Lying)   Pulse 76   Temp 36.3 °C (97.3 °F) (Temporal)   Resp 14   Wt 50.7 kg (111 lb 12.4 oz)   SpO2 99%   Intake/Output last 3 Shifts:    Intake/Output Summary (Last 24 hours) at 1/25/2025 1036  Last data filed at 1/25/2025 0458  Gross per 24 hour   Intake 2033.75 ml   Output 475 ml   Net 1558.75 ml       Admission Weight  Weight: 54.1 kg (119 lb 4.3 oz) (01/22/25 1841)    Daily Weight  01/23/25 : 50.7 kg (111 lb 12.4 oz)    Image Results      Physical Exam  Vitals reviewed.   Constitutional:       Appearance: She is normal weight. She is ill-appearing.   HENT:      Head: Normocephalic and atraumatic.      Right Ear: External ear normal.      Left Ear: External ear normal.      Nose: Nose normal.      Mouth/Throat:      Mouth: Mucous membranes are moist.      Pharynx: Oropharynx is clear.   Eyes:      Conjunctiva/sclera: Conjunctivae normal.      Pupils: Pupils are equal, round, and reactive to light.   Cardiovascular:      Rate and Rhythm: Normal rate. Rhythm irregular.      Pulses: Normal pulses.      Heart sounds: Normal heart sounds.   Pulmonary:      Effort: Pulmonary effort is normal.      Breath sounds: Rales present.   Abdominal:      General: Bowel sounds are normal.      Palpations: Abdomen is soft.   Musculoskeletal:      Cervical back: Normal range of motion and neck supple.      Right lower leg: No edema.      Left lower leg: Edema present.      Comments: Foot drop, and upper body contracture   Skin:     General: Skin is warm and dry.   Neurological:      Mental  Status: She is alert. Mental status is at baseline.       Relevant Results    Scheduled medications  amitriptyline, 10 mg, nasogastric tube, Nightly  azithromycin, 500 mg, intravenous, q24h  bisacodyl, 10 mg, rectal, Daily  cefTRIAXone, 2 g, intravenous, q24h  enoxaparin, 40 mg, subcutaneous, q24h  methylPREDNISolone sodium succinate (PF), 125 mg, intravenous, q6h  oxybutynin, 5 mg, nasogastric tube, TID  PATIENT OWN PUMP baclofen (Lioresal) 633.1 mcg/day PATIENT SUPPLIED, , pump - intrathecal, Continuous Pump  perphenazine, 4 mg, nasogastric tube, Nightly  polyethylene glycol, 17 g, nasogastric tube, Daily  potassium chloride, 40 mEq, nasogastric tube, BID  pregabalin, 200 mg, nasogastric tube, BID  sennosides, 2 tablet, oral, BID      Continuous medications  sodium chloride 0.9%, 75 mL/hr, Last Rate: 75 mL/hr (01/25/25 0632)      PRN medications  PRN medications: acetaminophen, albuterol, benzocaine-menthol, benzonatate, calcium carbonate, lubricating eye drops, meclizine, melatonin, ondansetron, oxygen, simethicone, sodium chloride    Results for orders placed or performed during the hospital encounter of 01/22/25 (from the past 24 hours)   Basic metabolic panel   Result Value Ref Range    Glucose 62 (L) 74 - 99 mg/dL    Sodium 146 (H) 136 - 145 mmol/L    Potassium 3.5 3.5 - 5.3 mmol/L    Chloride 109 (H) 98 - 107 mmol/L    Bicarbonate 24 21 - 32 mmol/L    Anion Gap 17 10 - 20 mmol/L    Urea Nitrogen 11 6 - 23 mg/dL    Creatinine 0.35 (L) 0.50 - 1.05 mg/dL    eGFR >90 >60 mL/min/1.73m*2    Calcium 8.0 (L) 8.6 - 10.3 mg/dL   Lavender Top   Result Value Ref Range    Extra Tube Hold for add-ons.    Basic Metabolic Panel   Result Value Ref Range    Glucose 283 (H) 74 - 99 mg/dL    Sodium 141 136 - 145 mmol/L    Potassium 3.2 (L) 3.5 - 5.3 mmol/L    Chloride 105 98 - 107 mmol/L    Bicarbonate 27 21 - 32 mmol/L    Anion Gap 12 10 - 20 mmol/L    Urea Nitrogen 20 6 - 23 mg/dL    Creatinine 0.41 (L) 0.50 - 1.05 mg/dL     eGFR >90 >60 mL/min/1.73m*2    Calcium 7.9 (L) 8.6 - 10.3 mg/dL   CBC   Result Value Ref Range    WBC 8.0 4.4 - 11.3 x10*3/uL    nRBC 0.4 (H) 0.0 - 0.0 /100 WBCs    RBC 2.80 (L) 4.00 - 5.20 x10*6/uL    Hemoglobin 8.2 (L) 12.0 - 16.0 g/dL    Hematocrit 25.8 (L) 36.0 - 46.0 %    MCV 92 80 - 100 fL    MCH 29.3 26.0 - 34.0 pg    MCHC 31.8 (L) 32.0 - 36.0 g/dL    RDW 14.6 (H) 11.5 - 14.5 %    Platelets 247 150 - 450 x10*3/uL                   Assessment/Plan        Assessment & Plan  Hypokalemia    Pneumonia of both lungs due to infectious organism    Multiple sclerosis (Multi)    Dehydration    Normocytic anemia    Severe protein-calorie malnutrition (Multi)    Chronic GERD    Community acquired bacterial pneumonia    Other constipation    Acute respiratory failure with hypoxia (Multi)    ARIE (obstructive sleep apnea)    Elevated troponin level not due myocardial infarction    Pneumonia of both lungs due to infectious organism, unspecified part of lung    Community acquired bacterial pneumonia  ARIE  Complex sleep apnea  Acute respiratory failure with hypoxia  - CT chest: Extensive nodular, ground-glass and confluent parenchymal opacities with lower lobe predominance suggestive of multifocal infectious/inflammatory process.  - SpO2 55% on RA during sleep  - supplemental oxygen to keep SpO2 > 90%  - was placed on oxygen 5lpm via NC during the night  - currently on RA  - patient has triology NIV at home  -  reports she will not wear the NIV here  - continue ceftriaxone 2 g daily and azithromycin 500 mg daily; day 4  - continue albuterol 2.5 mg q2h prn  - RT for bronchial hygiene  - blood cultures negative to date  - procalcitonin 0.25     Hypokalemia  - K 2.7 on admission; today K 3.2  - replaced with IV KCL and PO KCL  - discontinue 0.9% NS with 40 mEq at 100 ml/hr  - monitor BMP     Non MI troponin elevation  - serial troponin 47 > 42  - serial EKG; reviewed no ST segment changes     Constipation  - CT abd:  Extensive colonic stool burden suggestive of constipation.   - continue dulcolax 10 mg rectal daily  - continue colace 100 mg BID, senokot 17.2 g BID, miralax 17 g daily     Moderate protein calorie malnutrition  - dietician consult  - will place NGT 1/24/25  - continue TF per dietician's recommendations: Jevity 1.5 with a goal rate of 40 ml/hr  - water flushes 250 mL q6h     Normocytic anemia  Iron Deficiency  - iron 40, TIBC 229, % sat 17, Ferritin 255  - Hb 9.4 > 8.1 today  - monitor H&H  - will given IV venofer 300 mg 1/24/25  - started ferrous sulfate 325 mg daily and ascorbic acid 500 mg daily     Multiple Sclerosis, progressive  - continue baclofen pump 785 mcg/day  - continue amitriptyline 10 mg hs, perphenazine 4 mg hs  - continue solumedrol 125 mg q6h     OAB  - continue oxybutynin 5 mg TID     DVT ppx  - continue enoxaparin 40 mg daily     Code status: Full     Disposition: Patient requires more than 2 inpatient days.              Joanna Gar, APRN-CNP

## 2025-01-25 NOTE — CARE PLAN
The patient's goals for the shift include      The clinical goals for the shift include Patient will tolerate tube feed this shift    Patient tolerate tube feed through the NG rate was slowly moved up to goal of 40. She is also tolerating flushes. Her hands were elevated on pillows to reduce the swelling. She needed an order to be straight cathed due to urine retention.

## 2025-01-25 NOTE — CARE PLAN
The patient's goals for the shift include      The clinical goals for the shift include Patient will tolerate NGT through 1900    Over the shift, the patient did meet goal. Patient is still disoriented, denies discomfort at this time.  is at bedside. Will continue with POC, call light in reach.

## 2025-01-26 VITALS
DIASTOLIC BLOOD PRESSURE: 70 MMHG | OXYGEN SATURATION: 95 % | RESPIRATION RATE: 11 BRPM | HEART RATE: 73 BPM | TEMPERATURE: 99.1 F | WEIGHT: 111.77 LBS | BODY MASS INDEX: 18.62 KG/M2 | SYSTOLIC BLOOD PRESSURE: 136 MMHG | HEIGHT: 65 IN

## 2025-01-26 LAB
ANION GAP SERPL CALC-SCNC: 11 MMOL/L (ref 10–20)
BACTERIA BLD CULT: NORMAL
BACTERIA BLD CULT: NORMAL
BUN SERPL-MCNC: 29 MG/DL (ref 6–23)
CALCIUM SERPL-MCNC: 8.2 MG/DL (ref 8.6–10.3)
CHLORIDE SERPL-SCNC: 107 MMOL/L (ref 98–107)
CO2 SERPL-SCNC: 30 MMOL/L (ref 21–32)
CREAT SERPL-MCNC: 0.34 MG/DL (ref 0.5–1.05)
EGFRCR SERPLBLD CKD-EPI 2021: >90 ML/MIN/1.73M*2
ERYTHROCYTE [DISTWIDTH] IN BLOOD BY AUTOMATED COUNT: 14.8 % (ref 11.5–14.5)
GLUCOSE SERPL-MCNC: 257 MG/DL (ref 74–99)
HCT VFR BLD AUTO: 25.6 % (ref 36–46)
HGB BLD-MCNC: 8.2 G/DL (ref 12–16)
MCH RBC QN AUTO: 30.1 PG (ref 26–34)
MCHC RBC AUTO-ENTMCNC: 32 G/DL (ref 32–36)
MCV RBC AUTO: 94 FL (ref 80–100)
NRBC BLD-RTO: 0.4 /100 WBCS (ref 0–0)
PLATELET # BLD AUTO: 274 X10*3/UL (ref 150–450)
POTASSIUM SERPL-SCNC: 4.3 MMOL/L (ref 3.5–5.3)
RBC # BLD AUTO: 2.72 X10*6/UL (ref 4–5.2)
SODIUM SERPL-SCNC: 144 MMOL/L (ref 136–145)
WBC # BLD AUTO: 9.1 X10*3/UL (ref 4.4–11.3)

## 2025-01-26 PROCEDURE — 36415 COLL VENOUS BLD VENIPUNCTURE: CPT | Mod: IPSPLIT | Performed by: NURSE PRACTITIONER

## 2025-01-26 PROCEDURE — 80048 BASIC METABOLIC PNL TOTAL CA: CPT | Mod: IPSPLIT | Performed by: NURSE PRACTITIONER

## 2025-01-26 PROCEDURE — 2500000001 HC RX 250 WO HCPCS SELF ADMINISTERED DRUGS (ALT 637 FOR MEDICARE OP): Mod: IPSPLIT | Performed by: NURSE PRACTITIONER

## 2025-01-26 PROCEDURE — 85027 COMPLETE CBC AUTOMATED: CPT | Mod: IPSPLIT | Performed by: NURSE PRACTITIONER

## 2025-01-26 PROCEDURE — 1100000001 HC PRIVATE ROOM DAILY: Mod: IPSPLIT

## 2025-01-26 PROCEDURE — 51701 INSERT BLADDER CATHETER: CPT | Mod: IPSPLIT

## 2025-01-26 PROCEDURE — 94760 N-INVAS EAR/PLS OXIMETRY 1: CPT | Mod: IPSPLIT

## 2025-01-26 PROCEDURE — 2500000002 HC RX 250 W HCPCS SELF ADMINISTERED DRUGS (ALT 637 FOR MEDICARE OP, ALT 636 FOR OP/ED): Mod: IPSPLIT | Performed by: NURSE PRACTITIONER

## 2025-01-26 PROCEDURE — 2500000004 HC RX 250 GENERAL PHARMACY W/ HCPCS (ALT 636 FOR OP/ED): Mod: IPSPLIT | Performed by: NURSE PRACTITIONER

## 2025-01-26 PROCEDURE — 99232 SBSQ HOSP IP/OBS MODERATE 35: CPT | Performed by: NURSE PRACTITIONER

## 2025-01-26 PROCEDURE — 2500000004 HC RX 250 GENERAL PHARMACY W/ HCPCS (ALT 636 FOR OP/ED): Mod: IPSPLIT | Performed by: STUDENT IN AN ORGANIZED HEALTH CARE EDUCATION/TRAINING PROGRAM

## 2025-01-26 RX ORDER — SODIUM CHLORIDE AND POTASSIUM CHLORIDE 150; 900 MG/100ML; MG/100ML
75 INJECTION, SOLUTION INTRAVENOUS CONTINUOUS
Status: DISCONTINUED | OUTPATIENT
Start: 2025-01-26 | End: 2025-01-26

## 2025-01-26 RX ADMIN — METHYLPREDNISOLONE SODIUM SUCCINATE 125 MG: 125 INJECTION, POWDER, FOR SOLUTION INTRAMUSCULAR; INTRAVENOUS at 23:55

## 2025-01-26 RX ADMIN — AZITHROMYCIN MONOHYDRATE 500 MG: 500 INJECTION, POWDER, LYOPHILIZED, FOR SOLUTION INTRAVENOUS at 20:26

## 2025-01-26 RX ADMIN — METHYLPREDNISOLONE SODIUM SUCCINATE 125 MG: 125 INJECTION, POWDER, FOR SOLUTION INTRAMUSCULAR; INTRAVENOUS at 18:05

## 2025-01-26 RX ADMIN — PREGABALIN 200 MG: 100 CAPSULE ORAL at 20:25

## 2025-01-26 RX ADMIN — POLYETHYLENE GLYCOL 3350 17 G: 17 POWDER, FOR SOLUTION ORAL at 09:08

## 2025-01-26 RX ADMIN — OXYCODONE HYDROCHLORIDE AND ACETAMINOPHEN 500 MG: 500 TABLET ORAL at 09:08

## 2025-01-26 RX ADMIN — OXYBUTYNIN CHLORIDE 5 MG: 5 TABLET ORAL at 20:25

## 2025-01-26 RX ADMIN — PREGABALIN 200 MG: 100 CAPSULE ORAL at 09:09

## 2025-01-26 RX ADMIN — OXYBUTYNIN CHLORIDE 5 MG: 5 TABLET ORAL at 17:01

## 2025-01-26 RX ADMIN — METHYLPREDNISOLONE SODIUM SUCCINATE 125 MG: 125 INJECTION, POWDER, FOR SOLUTION INTRAMUSCULAR; INTRAVENOUS at 06:19

## 2025-01-26 RX ADMIN — FERROUS SULFATE TAB 325 MG (65 MG ELEMENTAL FE) 325 MG: 325 (65 FE) TAB at 09:08

## 2025-01-26 RX ADMIN — ENOXAPARIN SODIUM 40 MG: 100 INJECTION SUBCUTANEOUS at 20:40

## 2025-01-26 RX ADMIN — SENNOSIDES 17.2 MG: 8.6 TABLET, FILM COATED ORAL at 20:25

## 2025-01-26 RX ADMIN — METHYLPREDNISOLONE SODIUM SUCCINATE 125 MG: 125 INJECTION, POWDER, FOR SOLUTION INTRAMUSCULAR; INTRAVENOUS at 12:35

## 2025-01-26 RX ADMIN — SENNOSIDES 17.2 MG: 8.6 TABLET, FILM COATED ORAL at 09:08

## 2025-01-26 RX ADMIN — AMITRIPTYLINE HYDROCHLORIDE 10 MG: 10 TABLET, FILM COATED ORAL at 20:25

## 2025-01-26 RX ADMIN — OXYBUTYNIN CHLORIDE 5 MG: 5 TABLET ORAL at 09:09

## 2025-01-26 RX ADMIN — BISACODYL 10 MG: 10 SUPPOSITORY RECTAL at 09:09

## 2025-01-26 RX ADMIN — CEFTRIAXONE 2 G: 2 INJECTION, SOLUTION INTRAVENOUS at 19:54

## 2025-01-26 RX ADMIN — PERPHENAZINE 4 MG: 4 TABLET, FILM COATED ORAL at 20:25

## 2025-01-26 ASSESSMENT — COGNITIVE AND FUNCTIONAL STATUS - GENERAL
EATING MEALS: TOTAL
WALKING IN HOSPITAL ROOM: TOTAL
MOVING FROM LYING ON BACK TO SITTING ON SIDE OF FLAT BED WITH BEDRAILS: TOTAL
CLIMB 3 TO 5 STEPS WITH RAILING: TOTAL
WALKING IN HOSPITAL ROOM: TOTAL
STANDING UP FROM CHAIR USING ARMS: TOTAL
MOVING TO AND FROM BED TO CHAIR: TOTAL
TOILETING: TOTAL
MOVING TO AND FROM BED TO CHAIR: TOTAL
TURNING FROM BACK TO SIDE WHILE IN FLAT BAD: TOTAL
MOBILITY SCORE: 6
EATING MEALS: TOTAL
DAILY ACTIVITIY SCORE: 6
MOVING FROM LYING ON BACK TO SITTING ON SIDE OF FLAT BED WITH BEDRAILS: TOTAL
HELP NEEDED FOR BATHING: TOTAL
DRESSING REGULAR UPPER BODY CLOTHING: TOTAL
DRESSING REGULAR UPPER BODY CLOTHING: TOTAL
PERSONAL GROOMING: TOTAL
DRESSING REGULAR LOWER BODY CLOTHING: TOTAL
TURNING FROM BACK TO SIDE WHILE IN FLAT BAD: TOTAL
CLIMB 3 TO 5 STEPS WITH RAILING: TOTAL
PERSONAL GROOMING: TOTAL
DAILY ACTIVITIY SCORE: 6
STANDING UP FROM CHAIR USING ARMS: TOTAL
HELP NEEDED FOR BATHING: TOTAL
TOILETING: TOTAL
DRESSING REGULAR LOWER BODY CLOTHING: TOTAL
MOBILITY SCORE: 6

## 2025-01-26 ASSESSMENT — PAIN - FUNCTIONAL ASSESSMENT: PAIN_FUNCTIONAL_ASSESSMENT: 0-10

## 2025-01-26 ASSESSMENT — PAIN SCALES - GENERAL: PAINLEVEL_OUTOF10: 0 - NO PAIN

## 2025-01-26 NOTE — NURSING NOTE
"Pt smiling, verbal - simple responses to questions, slow to respond at times, requesting Tylenol for \"throat\" pain. NG tube intact, tolerating continuous tube feeding and flushes. Tolerating IV ATB. Skin generalized pale, face flushed, afebrile, warm, dry.  and family at bedside at start of shift.  "

## 2025-01-26 NOTE — PROGRESS NOTES
Shameka Zaragoza is a 54 y.o. female on day 3 of admission presenting with Hypokalemia.      Subjective   Shameka is seen in her room. Tube feedings infusing as ordered.   Spouse in to see patient in afternoon. He is updated that will continue current treatment with high dose solumedrol, rocephin, and azithromycin.   Discussed with spouse the need for an NGT and the increased risk of aspiration. Souse expressed that the aspiration was caused by her bipap machine that she uses at night and that this has happened in the past and was related to her breathing machine she uses at night. Spouse agrees with speech consult.        Objective     Last Recorded Vitals  /67 (BP Location: Right arm, Patient Position: Lying)   Pulse 70   Temp 36.5 °C (97.7 °F) (Temporal)   Resp 16   Wt 50.7 kg (111 lb 12.4 oz)   SpO2 94%   Intake/Output last 3 Shifts:    Intake/Output Summary (Last 24 hours) at 1/26/2025 1250  Last data filed at 1/26/2025 0900  Gross per 24 hour   Intake 1529 ml   Output 1000 ml   Net 529 ml       Admission Weight  Weight: 54.1 kg (119 lb 4.3 oz) (01/22/25 1841)    Daily Weight  01/23/25 : 50.7 kg (111 lb 12.4 oz)    Image Results  CT head wo IV contrast  Narrative: Interpreted By:  Ashish Flaherty,   STUDY:  CT HEAD WO IV CONTRAST;  1/24/2025 5:52 pm      INDICATION:  Signs/Symptoms:altered mental status.          COMPARISON:  10/06/2023      ACCESSION NUMBER(S):  [id number]      ORDERING CLINICIAN:  RADHA RODRIGUEZ      TECHNIQUE:  Noncontrast axial CT images of head were obtained with coronal and  sagittal reconstructed images.      FINDINGS:  BRAIN PARENCHYMA:  No acute intraparenchymal hemorrhage or  parenchymal evidence of acute large territory ischemic infarct.  Gray-white matter distinction is preserved. No mass-effect.      VENTRICLES and EXTRA-AXIAL SPACES:  No acute extra-axial or  intraventricular hemorrhage. No effacement of cerebral sulci. The  ventricles and sulci are age-concordant.       PARANASAL SINUSES/MASTOIDS:  No hemorrhage or air-fluid levels within  the visualized paranasal sinuses. The mastoids are well aerated.      CALVARIUM/ORBITS:  No skull fracture.  The orbits and globes are  intact to the extent visualized.      EXTRACRANIAL SOFT TISSUES: No discernible abnormality.      Impression: No acute intracranial abnormality.          MACRO:  None.      Signed by: Ashish Flaherty 1/24/2025 6:20 PM  Dictation workstation:   [id number]  XR chest abdomen for OG NG placement  Narrative: Interpreted By:  Stephen Omer,   STUDY:  XR CHEST ABDOMEN FOR OG NG PLACEMENT; 1/24/2025 2:24 pm      INDICATION:  Signs/Symptoms:verification of NG placement.      COMPARISON:  01/22/2025      ACCESSION NUMBER(S):  [id number]      ORDERING CLINICIAN:  DELIO WEBER      FINDINGS:  A single AP radiograph of the chest and upper abdomen was obtained.  An enteric tube is present, with the tip overlying the mid stomach.  Hazy opacity is seen at the left lung base, and may represent  atelectasis and/or pneumonia. There is a nonobstructive bowel gas  pattern present. Free intraperitoneal air and air-fluid levels cannot  be excluded without upright or decubitus images.      Impression: Enteric tube placement, as above.      MACRO:  None      Signed by: Stephen Omer 1/24/2025 3:41 PM  Dictation workstation:   [id number]    Results for orders placed or performed during the hospital encounter of 01/22/25 (from the past 24 hours)   Basic Metabolic Panel   Result Value Ref Range    Glucose 257 (H) 74 - 99 mg/dL    Sodium 144 136 - 145 mmol/L    Potassium 4.3 3.5 - 5.3 mmol/L    Chloride 107 98 - 107 mmol/L    Bicarbonate 30 21 - 32 mmol/L    Anion Gap 11 10 - 20 mmol/L    Urea Nitrogen 29 (H) 6 - 23 mg/dL    Creatinine 0.34 (L) 0.50 - 1.05 mg/dL    eGFR >90 >60 mL/min/1.73m*2    Calcium 8.2 (L) 8.6 - 10.3 mg/dL   CBC   Result Value Ref Range    WBC 9.1 4.4 - 11.3 x10*3/uL    nRBC 0.4 (H) 0.0 - 0.0 /100 WBCs     RBC 2.72 (L) 4.00 - 5.20 x10*6/uL    Hemoglobin 8.2 (L) 12.0 - 16.0 g/dL    Hematocrit 25.6 (L) 36.0 - 46.0 %    MCV 94 80 - 100 fL    MCH 30.1 26.0 - 34.0 pg    MCHC 32.0 32.0 - 36.0 g/dL    RDW 14.8 (H) 11.5 - 14.5 %    Platelets 274 150 - 450 x10*3/uL     Physical Exam  Vitals reviewed.   Constitutional:       Appearance: She is normal weight. She is ill-appearing.   HENT:      Head: Normocephalic and atraumatic.      Right Ear: External ear normal.      Left Ear: External ear normal.      Nose: Nose normal.      Mouth/Throat:      Mouth: Mucous membranes are moist.      Pharynx: Oropharynx is clear.   Eyes:      Conjunctiva/sclera: Conjunctivae normal.      Pupils: Pupils are equal, round, and reactive to light.   Cardiovascular:      Rate and Rhythm: Normal rate. Rhythm irregular.      Pulses: Normal pulses.      Heart sounds: Normal heart sounds.   Pulmonary:      Effort: Pulmonary effort is normal.      Breath sounds: Scattered rhonchi  Abdominal:      General: Bowel sounds are normal.      Palpations: Abdomen is soft.   Musculoskeletal:      Cervical back: Normal range of motion and neck supple.      Right lower leg: No edema.      Left lower leg: Edema present.      Bilateral hands with edema and contractures.     Comments: Foot drop, and upper body contracture   Skin:     General: Skin is warm and dry. Flushed  Neurological:      Mental Status: She is alert. Mental status is at baseline.     Scheduled medications  amitriptyline, 10 mg, nasogastric tube, Nightly  ascorbic acid, 500 mg, nasogastric tube, Daily  azithromycin, 500 mg, intravenous, q24h  bisacodyl, 10 mg, rectal, Daily  cefTRIAXone, 2 g, intravenous, q24h  enoxaparin, 40 mg, subcutaneous, q24h  ferrous sulfate (325 mg ferrous sulfate), 65 mg of iron, oral, Daily with breakfast  methylPREDNISolone sodium succinate (PF), 125 mg, intravenous, q6h  oxybutynin, 5 mg, nasogastric tube, TID  PATIENT OWN PUMP baclofen (Lioresal) 633.1 mcg/day PATIENT  SUPPLIED, , pump - intrathecal, Continuous Pump  perphenazine, 4 mg, nasogastric tube, Nightly  polyethylene glycol, 17 g, nasogastric tube, Daily  pregabalin, 200 mg, nasogastric tube, BID  sennosides, 2 tablet, oral, BID      Continuous medications     PRN medications  PRN medications: acetaminophen **OR** acetaminophen, albuterol, benzocaine-menthol, benzonatate, calcium carbonate, lubricating eye drops, meclizine, melatonin, ondansetron, oxygen, simethicone, sodium chloride    Assessment/Plan      CAP vs Asp PNA  ARIE  Complex sleep apnea  Acute respiratory failure with hypoxia  - CT chest: Extensive nodular, ground-glass and confluent parenchymal opacities with lower lobe predominance suggestive of multifocal infectious/inflammatory process.  - SpO2 55% on RA during sleep  - supplemental oxygen to keep SpO2 > 90%  - was placed on oxygen 5lpm via NC during the night  - currently on RA  - patient has triology NIV at home  -  reports she will not wear the NIV here  - continue ceftriaxone 2 g daily and azithromycin 500 mg daily; day 5  - continue albuterol 2.5 mg q2h prn  - RT for bronchial hygiene  - blood cultures negative to date  - procalcitonin 0.25    #Dysphagia  ~known oropharyngeal delay  ~NGT with TF infusing  ~speech consulted     Hypokalemia  - K 2.7 on admission;  K 3.2>4.3  - replaced with IV KCL and PO KCL  - discontinue 0.9% NS with 40 mEq at 100 ml/hr  - monitor BMP     Non MI troponin elevation  - serial troponin 47 > 42  - serial EKG; reviewed no ST segment changes     Constipation  - CT abd: Extensive colonic stool burden suggestive of constipation.   - continue dulcolax 10 mg rectal daily  - continue colace 100 mg BID, senokot 17.2 g BID, miralax 17 g daily     Moderate protein calorie malnutrition  - dietician consult  - will place NGT 1/24/25  - continue TF per dietician's recommendations: Jevity 1.5 with a goal rate of 40 ml/hr  - water flushes 250 mL q6h     Normocytic anemia  Iron  Deficiency  - iron 40, TIBC 229, % sat 17, Ferritin 255  - Hb 9.4 > 8.1 today  - monitor H&H  - will given IV venofer 300 mg 1/24/25  - started ferrous sulfate 325 mg daily and ascorbic acid 500 mg daily     Multiple Sclerosis, progressive  - continue baclofen pump 785 mcg/day  - continue amitriptyline 10 mg hs, perphenazine 4 mg hs  - continue solumedrol 125 mg q6h     OAB  - continue oxybutynin 5 mg TID     DVT ppx  - continue enoxaparin 40 mg daily     Code status: Full     Disposition: Patient requires more than 2 inpatient days.    Melinda Rosado, APRN-CNP

## 2025-01-26 NOTE — CARE PLAN
The patient's goals for the shift include      The clinical goals for the shift include Pt will have no vomiting this shift    Patient continued to tolerate tube feed throughout the night. Water flushes were tolerated. Patient talking and responding more this shift. Patient tolerating straight cath.

## 2025-01-26 NOTE — CARE PLAN
The patient's goals for the shift include  be more awake and responsive to staff and family.     The clinical goals for the shift include Pt will have no vomiting this shift    Over the shift, the patient did not make progress toward the following goals. Barriers to progression include MS disease process. Recommendations to address these barriers include follow plan of care and medication regimen.

## 2025-01-27 LAB
ANION GAP SERPL CALC-SCNC: 11 MMOL/L (ref 10–20)
BUN SERPL-MCNC: 24 MG/DL (ref 6–23)
CALCIUM SERPL-MCNC: 8 MG/DL (ref 8.6–10.3)
CHLORIDE SERPL-SCNC: 102 MMOL/L (ref 98–107)
CO2 SERPL-SCNC: 32 MMOL/L (ref 21–32)
CREAT SERPL-MCNC: 0.31 MG/DL (ref 0.5–1.05)
EGFRCR SERPLBLD CKD-EPI 2021: >90 ML/MIN/1.73M*2
ERYTHROCYTE [DISTWIDTH] IN BLOOD BY AUTOMATED COUNT: 14.9 % (ref 11.5–14.5)
GLUCOSE SERPL-MCNC: 190 MG/DL (ref 74–99)
HCT VFR BLD AUTO: 23.8 % (ref 36–46)
HGB BLD-MCNC: 7.4 G/DL (ref 12–16)
MCH RBC QN AUTO: 30 PG (ref 26–34)
MCHC RBC AUTO-ENTMCNC: 31.1 G/DL (ref 32–36)
MCV RBC AUTO: 96 FL (ref 80–100)
NRBC BLD-RTO: 0.3 /100 WBCS (ref 0–0)
PLATELET # BLD AUTO: 321 X10*3/UL (ref 150–450)
POTASSIUM SERPL-SCNC: 3.8 MMOL/L (ref 3.5–5.3)
RBC # BLD AUTO: 2.47 X10*6/UL (ref 4–5.2)
SODIUM SERPL-SCNC: 141 MMOL/L (ref 136–145)
WBC # BLD AUTO: 9 X10*3/UL (ref 4.4–11.3)

## 2025-01-27 PROCEDURE — 80048 BASIC METABOLIC PNL TOTAL CA: CPT | Mod: IPSPLIT | Performed by: NURSE PRACTITIONER

## 2025-01-27 PROCEDURE — 3E0G76Z INTRODUCTION OF NUTRITIONAL SUBSTANCE INTO UPPER GI, VIA NATURAL OR ARTIFICIAL OPENING: ICD-10-PCS | Performed by: NURSE PRACTITIONER

## 2025-01-27 PROCEDURE — 99232 SBSQ HOSP IP/OBS MODERATE 35: CPT | Performed by: NURSE PRACTITIONER

## 2025-01-27 PROCEDURE — 94760 N-INVAS EAR/PLS OXIMETRY 1: CPT | Mod: IPSPLIT

## 2025-01-27 PROCEDURE — 92610 EVALUATE SWALLOWING FUNCTION: CPT | Mod: GN,IPSPLIT

## 2025-01-27 PROCEDURE — 36415 COLL VENOUS BLD VENIPUNCTURE: CPT | Mod: IPSPLIT | Performed by: NURSE PRACTITIONER

## 2025-01-27 PROCEDURE — 2500000001 HC RX 250 WO HCPCS SELF ADMINISTERED DRUGS (ALT 637 FOR MEDICARE OP): Mod: IPSPLIT | Performed by: NURSE PRACTITIONER

## 2025-01-27 PROCEDURE — 2500000004 HC RX 250 GENERAL PHARMACY W/ HCPCS (ALT 636 FOR OP/ED): Mod: IPSPLIT | Performed by: NURSE PRACTITIONER

## 2025-01-27 PROCEDURE — 85027 COMPLETE CBC AUTOMATED: CPT | Mod: IPSPLIT | Performed by: NURSE PRACTITIONER

## 2025-01-27 PROCEDURE — 1100000001 HC PRIVATE ROOM DAILY: Mod: IPSPLIT

## 2025-01-27 PROCEDURE — 2500000004 HC RX 250 GENERAL PHARMACY W/ HCPCS (ALT 636 FOR OP/ED): Mod: JZ,IPSPLIT | Performed by: NURSE PRACTITIONER

## 2025-01-27 PROCEDURE — 2500000001 HC RX 250 WO HCPCS SELF ADMINISTERED DRUGS (ALT 637 FOR MEDICARE OP): Mod: IPSPLIT | Performed by: STUDENT IN AN ORGANIZED HEALTH CARE EDUCATION/TRAINING PROGRAM

## 2025-01-27 PROCEDURE — 2500000002 HC RX 250 W HCPCS SELF ADMINISTERED DRUGS (ALT 637 FOR MEDICARE OP, ALT 636 FOR OP/ED): Mod: IPSPLIT | Performed by: NURSE PRACTITIONER

## 2025-01-27 RX ORDER — PREDNISONE 20 MG/1
40 TABLET ORAL DAILY
Status: COMPLETED | OUTPATIENT
Start: 2025-01-29 | End: 2025-01-29

## 2025-01-27 RX ORDER — PREDNISONE 20 MG/1
20 TABLET ORAL DAILY
Status: DISCONTINUED | OUTPATIENT
Start: 2025-01-30 | End: 2025-01-29 | Stop reason: HOSPADM

## 2025-01-27 RX ORDER — AMOXICILLIN AND CLAVULANATE POTASSIUM 875; 125 MG/1; MG/1
1 TABLET, FILM COATED ORAL EVERY 12 HOURS SCHEDULED
Status: COMPLETED | OUTPATIENT
Start: 2025-01-27 | End: 2025-01-29

## 2025-01-27 RX ORDER — PREDNISONE 10 MG/1
10 TABLET ORAL DAILY
Status: DISCONTINUED | OUTPATIENT
Start: 2025-01-31 | End: 2025-01-29 | Stop reason: HOSPADM

## 2025-01-27 RX ORDER — AZITHROMYCIN 250 MG/1
500 TABLET, FILM COATED ORAL
Status: DISCONTINUED | OUTPATIENT
Start: 2025-01-27 | End: 2025-01-27

## 2025-01-27 RX ORDER — AMOXICILLIN AND CLAVULANATE POTASSIUM 875; 125 MG/1; MG/1
1 TABLET, FILM COATED ORAL EVERY 12 HOURS SCHEDULED
Status: DISCONTINUED | OUTPATIENT
Start: 2025-01-27 | End: 2025-01-27

## 2025-01-27 RX ORDER — PREDNISONE 20 MG/1
60 TABLET ORAL DAILY
Status: COMPLETED | OUTPATIENT
Start: 2025-01-28 | End: 2025-01-28

## 2025-01-27 RX ADMIN — AMITRIPTYLINE HYDROCHLORIDE 10 MG: 10 TABLET, FILM COATED ORAL at 21:09

## 2025-01-27 RX ADMIN — METHYLPREDNISOLONE SODIUM SUCCINATE 125 MG: 125 INJECTION, POWDER, FOR SOLUTION INTRAMUSCULAR; INTRAVENOUS at 19:45

## 2025-01-27 RX ADMIN — OXYBUTYNIN CHLORIDE 5 MG: 5 TABLET ORAL at 08:45

## 2025-01-27 RX ADMIN — METHYLPREDNISOLONE SODIUM SUCCINATE 125 MG: 125 INJECTION, POWDER, FOR SOLUTION INTRAMUSCULAR; INTRAVENOUS at 13:23

## 2025-01-27 RX ADMIN — ACETAMINOPHEN 650 MG: 325 TABLET ORAL at 08:43

## 2025-01-27 RX ADMIN — AMOXICILLIN AND CLAVULANATE POTASSIUM 1 TABLET: 875; 125 TABLET, FILM COATED ORAL at 21:09

## 2025-01-27 RX ADMIN — METHYLPREDNISOLONE SODIUM SUCCINATE 125 MG: 125 INJECTION, POWDER, FOR SOLUTION INTRAMUSCULAR; INTRAVENOUS at 06:19

## 2025-01-27 RX ADMIN — SENNOSIDES 17.2 MG: 8.6 TABLET, FILM COATED ORAL at 08:44

## 2025-01-27 RX ADMIN — FERROUS SULFATE TAB 325 MG (65 MG ELEMENTAL FE) 325 MG: 325 (65 FE) TAB at 08:44

## 2025-01-27 RX ADMIN — OXYCODONE HYDROCHLORIDE AND ACETAMINOPHEN 500 MG: 500 TABLET ORAL at 08:44

## 2025-01-27 RX ADMIN — POLYETHYLENE GLYCOL 3350 17 G: 17 POWDER, FOR SOLUTION ORAL at 08:44

## 2025-01-27 RX ADMIN — PREGABALIN 200 MG: 100 CAPSULE ORAL at 21:09

## 2025-01-27 RX ADMIN — ENOXAPARIN SODIUM 40 MG: 100 INJECTION SUBCUTANEOUS at 21:08

## 2025-01-27 RX ADMIN — PERPHENAZINE 4 MG: 4 TABLET, FILM COATED ORAL at 21:09

## 2025-01-27 RX ADMIN — OXYBUTYNIN CHLORIDE 5 MG: 5 TABLET ORAL at 21:09

## 2025-01-27 RX ADMIN — OXYBUTYNIN CHLORIDE 5 MG: 5 TABLET ORAL at 15:03

## 2025-01-27 RX ADMIN — PREGABALIN 200 MG: 100 CAPSULE ORAL at 08:44

## 2025-01-27 ASSESSMENT — COGNITIVE AND FUNCTIONAL STATUS - GENERAL
HELP NEEDED FOR BATHING: TOTAL
CLIMB 3 TO 5 STEPS WITH RAILING: TOTAL
TURNING FROM BACK TO SIDE WHILE IN FLAT BAD: TOTAL
WALKING IN HOSPITAL ROOM: TOTAL
DAILY ACTIVITIY SCORE: 6
EATING MEALS: TOTAL
DRESSING REGULAR UPPER BODY CLOTHING: TOTAL
PERSONAL GROOMING: TOTAL
MOBILITY SCORE: 6
STANDING UP FROM CHAIR USING ARMS: TOTAL
TOILETING: TOTAL
MOVING TO AND FROM BED TO CHAIR: TOTAL
MOVING FROM LYING ON BACK TO SITTING ON SIDE OF FLAT BED WITH BEDRAILS: TOTAL
DRESSING REGULAR LOWER BODY CLOTHING: TOTAL

## 2025-01-27 ASSESSMENT — PAIN DESCRIPTION - LOCATION: LOCATION: HEAD

## 2025-01-27 ASSESSMENT — PAIN SCALES - GENERAL
PAINLEVEL_OUTOF10: 1
PAINLEVEL_OUTOF10: 0 - NO PAIN
PAINLEVEL_OUTOF10: 0 - NO PAIN

## 2025-01-27 ASSESSMENT — PAIN - FUNCTIONAL ASSESSMENT
PAIN_FUNCTIONAL_ASSESSMENT: 0-10
PAIN_FUNCTIONAL_ASSESSMENT: 0-10

## 2025-01-27 NOTE — PROGRESS NOTES
Speech-Language Pathology    SLP Adult Inpatient Speech-Language Pathology Clinical Swallow Evaluation    Patient Name: Shameka Zaragoza  MRN: [id number]  Today's Date: 1/27/2025   Time Calculation  Start Time: 0945  Stop Time: 1005  Time Calculation (min): 20 min         Current Problem:   1. Pneumonia of both lungs due to infectious organism, unspecified part of lung        2. Hypokalemia        3. Dehydration            Recommendations:  Solid consistency: IDDSI 7/regular solids   Liquid consistency: IDDSI 0/thin liquids   Medication administration: Whole in thin liquid, Whole in puree, per pt preference  Compensatory swallow strategies: Seated upright - as close to 90 degrees as possible, Remain upright for 2-3 hours after meals, 1:1 assistance with meals, Alternate solids and liquids, Single sips, Small bites/sips, and Stringent oral care routine - 4-6x/day (before/after meals)  MBSS: No    Assessment:  Pt presents with functional swallow with no suspected pharyngeal dysphagia upon completion of CSE. However, silent aspiration cannot be ruled out at the bedside. Given that there is no suspected laryngeal dysfunction and/or airway compromise, pt is at a low risk of developing future pulmonary complications associated with aspiration given the identified risk factors and prognostic factors.  Prognosis: Excellent    Plan:   Based on results of CSE, pt's swallow is functional for age. No dysphagia therapy indicated at this time, reconsult ST as needed.   Inpatient/Swing Bed or Outpatient: Inpatient  Discussed POC: Patient, Nursing  Discussed Risks/Benefits: Yes  Patient/Caregiver Agreeable: Yes   Discharge recommendation: Home with no further SLP  ST OK to Discharge: Yes    Impressions:  Current Diet: IDDSI 7/regular solids and IDDSI 0/thin liquids   Relevant imaging results: CT chest: Extensive nodular, ground-glass and confluent parenchymal opacities with lower lobe predominance suggestive of multifocal  infectious/inflammatory process.   Temperature: afebrile  Labs: BUN high and Creatinine low; concern for weakness, lethargy, and fatigue  Respiratory Status: Room air; SPO2 95; RR11  Cranial Nerve Exam: grossly WFL  Oral [facility] Exam: Dentition: , Natural/Adequate, Mucosa: , pink/moist, free of obvious lesions, and Adequate oral health/hygiene  Sandie Swallow Protocol: Contraindicated d/t modified diet  Textures Administered: Sips Water, Pudding, Scrambled Eggs, and Chris Cracker/Jazz Doone   Clinical Observations: Oral phase - No anterior loss of liquids or solids. Slightly prolonged, however adequate mastication of regular solids. No oral residuals observed. Pharyngeal phase - no overt s/s of aspiration across all consistencies presented during assessment.   Risk factors for Aspiration PNA: Polypharmacy (>5), Hx of dysphagia/reflux, Bedfast, Dependent for oral care, and Dependent for feeding  Prognostic factors that mitigate risk: Stable immune status and Adequate oral health/hygiene       Goals:   N/A - Eval Only    General Information:  Chief Complaint: Medical team request CSE as pt presenting with aspiration PNA, NGT placed on 1/24; with goal of removal today. Pt's  present at bedside and assisting with hx. Pt and spouse deny swallow difficulties, attribute current aspiration PNA to emetic episode, as emesis occurred while pt was sleeping with   Pt status: Pt alert and oriented x4.   PMHx MS, GERD, UTI  SLP Received On: 01/27/25  Patient Class: Inpatient   Living Environment: Home  Ordering Physician: Silvestre Palomo MD  Reason for Referral: Suspected oral/pharyngeal dysphagia, r/o aspiration, determine if MBSS is needed  Past Medical History Relevant to Rehab: Shameka Zaragoza is a 54 y.o. female presenting with a complaintof decreased oral intake. She has progressive multiple sclerosis and has not been wanting to eat or drink since 1/19/25.  is concerned she is becoming dehydrated. She had 1  episode of emesis. There has been no fever, chills, or change in mentation. She has been more fatigue and increased sleepiness.  Prior to Session Communication: Bedside nurse  Date of Onset: 01/20/25  Date of Order: 01/26/25  [employer] Diet: Regular/Thin  Current Diet : Regular/Thin    Pain:   Pain Assessment: 0-10  0-10 (Numeric) Pain Score: 0 - No pain           Treatment  Treatment provided: No      Education:  Learner Patient and Spouse   Barriers to Learning None   Method Verbal   Education - Topic ST provided patient education regarding role of ST, purpose of assessment, clinical impressions, goals of treatment, and plan of care. Patient verbalized questionable full comprehension, consistent with cognitive status. Education will be reinforced. ST further coordinated with RN regarding recommendations and precautions per this assessment, with RN verbalizing understanding.     Outcome    Verbalized understanding, Verbalized agreement, Education Goals: , and Meets goals/outcomes

## 2025-01-27 NOTE — PROGRESS NOTES
"Nutrition Follow Up Assessment:   Nutrition Assessment       Nutrition History:  Food and Nutrient History: Pt visited in room,  at bedside. NG tube pulled this afternoon.  states pt ate well for breakfast and lunch, but noticed she is pocketing her food and needs cueing.  states wife drinks Boost at home and would like to receive it here. Pt and wife both agree to receive Ensure on every tray. Pt states she likes all flavors.     Anthropometrics:  Height: 165.1 cm (5' 5\")   Weight: 50.7 kg (111 lb 12.4 oz)   BMI (Calculated): 18.6  IBW/kg (Dietitian Calculated): 56 kg  Percent of IBW: 91 %     Weight Change %:  Weight History / % Weight Change: 01/23/25 - 50.7 kg; 10/18/23 - 59.6 kg (limited wt history 2/2 telehealth visits)  Significant Weight Loss: No    Nutrition Significant Labs:  BMP Trend:   Results from last 7 days   Lab Units 01/27/25  0603 01/26/25  0654 01/25/25  0635 01/24/25  1101   GLUCOSE mg/dL 190* 257* 283* 62*   CALCIUM mg/dL 8.0* 8.2* 7.9* 8.0*   SODIUM mmol/L 141 144 141 146*   POTASSIUM mmol/L 3.8 4.3 3.2* 3.5   CO2 mmol/L 32 30 27 24   CHLORIDE mmol/L 102 107 105 109*   BUN mg/dL 24* 29* 20 11   CREATININE mg/dL 0.31* 0.34* 0.41* 0.35*    , Renal Lab Trend:   Results from last 7 days   Lab Units 01/27/25  0603 01/26/25  0654 01/25/25  0635 01/24/25  1101 01/23/25  0604 01/22/25  1910   POTASSIUM mmol/L 3.8 4.3 3.2* 3.5   < > 2.7*   SODIUM mmol/L 141 144 141 146*   < > 134*   MAGNESIUM mg/dL  --   --   --   --   --  1.60   EGFR mL/min/1.73m*2 >90 >90 >90 >90   < > >90   BUN mg/dL 24* 29* 20 11   < > 17   CREATININE mg/dL 0.31* 0.34* 0.41* 0.35*   < > 0.42*    < > = values in this interval not displayed.      Nutrition Specific Medications:  amitriptyline, 10 mg, nasogastric tube, Nightly  amoxicillin-pot clavulanate, 1 tablet, oral, q12h MARII  ascorbic acid, 500 mg, nasogastric tube, Daily  bisacodyl, 10 mg, rectal, Daily  enoxaparin, 40 mg, subcutaneous, q24h  ferrous " sulfate (325 mg ferrous sulfate), 65 mg of iron, oral, Daily with breakfast  methylPREDNISolone sodium succinate (PF), 125 mg, intravenous, q6h  oxybutynin, 5 mg, nasogastric tube, TID  PATIENT OWN PUMP baclofen (Lioresal) 633.1 mcg/day PATIENT SUPPLIED, , pump - intrathecal, Continuous Pump  perphenazine, 4 mg, nasogastric tube, Nightly  polyethylene glycol, 17 g, nasogastric tube, Daily  [START ON 1/28/2025] predniSONE, 60 mg, oral, Daily   Followed by  [START ON 1/29/2025] predniSONE, 40 mg, oral, Daily   Followed by  [START ON 1/30/2025] predniSONE, 20 mg, oral, Daily   Followed by  [START ON 1/31/2025] predniSONE, 10 mg, oral, Daily  pregabalin, 200 mg, nasogastric tube, BID  sennosides, 2 tablet, oral, BID    I/O:   Last BM Date: 01/27/25; Stool Appearance: Soft (01/27/25 1200)    Dietary Orders (From admission, onward)       Start     Ordered    01/27/25 1030  Adult diet Regular  Diet effective now        Question:  Diet type  Answer:  Regular    01/27/25 1029    01/23/25 0116  May Participate in Room Service With Assistance  ( ROOM SERVICE MAY PARTICIPATE WITH ASSISTANCE)  Once        Question:  .  Answer:  Yes    01/23/25 0115                   Estimated Needs:   Total Energy Estimated Needs in 24 hours (kCal): 1400 kCal (8591-4790 Kcal/day)  Method for Estimating Needs: 25-30 Kcal/kg  Total Protein Estimated Needs in 24 Hours (g): 55 g  Method for Estimating 24 Hour Protein Needs: 1.0-1.2 gm/kg  Total Fluid Estimated Needs in 24 Hours (mL): 1750 mL  Method for Estimating 24 Hour Fluid Needs: 30-35 mL/Kg        Nutrition Diagnosis   Nutrition Diagnosis  Patient has Nutrition Diagnosis: Yes  Diagnosis Status (1): Active  Nutrition Diagnosis 1: Inadequate protein energy intake  Related to (1): Poor PO intake  As Evidenced by (1): Poor intake reported for ~1 week prior to admission; No intake since time of admission; Low BMI 18.6; Hx progressive MS with new diagnosis of pneumonia  Additional Assessment  Information (1): NG pulled; oral diet initiated  Additional Nutrition Diagnosis: Diagnosis 2  Diagnosis Status (2): New    Nutrition Diagnosis 2: Altered nutrition related to laboratory values  Related to (2): steroid induced hyperglycemia  As Evidenced by (2): glucose 190-283 mg/dL       Nutrition Interventions/Recommendations   Nutrition prescription for oral nutrition    Nutrition Recommendations:  Individualized Nutrition Prescription Provided for : Diet - Regular diet; start Ensure plus high-protein TID    Nutrition Interventions/Goals:   Interventions: Feeding assistance management, Meals and snacks, Medical food supplement  Meals and Snacks: General healthful diet  Goal: Regular diet as tolerated  Goal:  and staff feeding pt  Medical Food Supplement: Commercial beverage medical food supplement therapy  Goal: Ensure plus high protein TID (350 Kcal and 20 gm protein per 8 fl oz)  Feeding position management; meal set-up management; mouth care management; menu selection assistance  Goal: keep head of bed elevated, feed patient, select soft/appropriate foods as tolerated, continue cueing to ensure no pocketing of foods  Coordination of Care with Providers: Nursing  Goal: Chelsie DENTON     Nutrition Monitoring and Evaluation   Food/Nutrient Related History Monitoring  Monitoring and Evaluation Plan: Estimated Energy Intake, Intake / amount of food  Estimated Energy Intake: Energy intake greater or equal to 75% of estimated energy needs  Intake / Amount of food: Consumes at least 75% or more of meals/snacks/supplements    Anthropometric Measurements  Monitoring and Evaluation Plan: Body weight  Body Weight: Body weight - Maintain stable weight, Body weight - Promote weight restoration    Biochemical Data, Medical Tests and Procedures  Monitoring and Evaluation Plan: Glucose/endocrine profile  Glucose/Endocrine Profile: Glucose within normal limits ( mg/dL)       Goal Status: New goal(s) identified    Time  Spent (min): 60 minutes

## 2025-01-27 NOTE — PROGRESS NOTES
01/27/25 1620   Discharge Planning   Living Arrangements Spouse/significant other   Support Systems Spouse/significant other   Type of Residence Private residence   Expected Discharge Disposition Home   Stroke Family Assessment   Stroke Family Assessment Needed No     Care Transition Progress Note 01/27/25    Patient: Shameka Zaragoza    Diagnosis: Hypokalemia    Patient Concerns: Pt  provided IM and he signed for pt. Met Pt today and she spoke slightly today to LSW. Pt reports no concerns today or current needs.     Patient Needs: No concerns today.     Care Transition Social Worker explained discharge process and reviewed Medicare Rights and had patient sign the Medicare Rights today and provided copy to the patient. MATTEO MA

## 2025-01-27 NOTE — CARE PLAN
The patient's goals for the shift include      The clinical goals for the shift include pt will tolerate treatments and medications as ordered this shift    Over the shift, the patient did not make progress toward the following goals. Barriers to progression include weakness, orientation. Recommendations to address these barriers include reorientation, medication therapy.

## 2025-01-27 NOTE — PROGRESS NOTES
Shameka Zaragoza is a 55 y.o. female on day 4 of admission presenting with Hypokalemia.      Subjective   Shameka is seen in her room in no acute distress. She is significantly more alert than previous day. Seen by speech and cleared for a regular diet.   Orders entered to DC NGT and resume regular diet.   Continue solumedrol treatment with plan for taper at 5-7 days of treatment.   Transition to oral dosing regarding antibiotic therapy.       Objective     Last Recorded Vitals  /70 (BP Location: Right arm, Patient Position: Lying)   Pulse 73   Temp 37.8 °C (100.1 °F)   Resp 11   Wt 50.7 kg (111 lb 12.4 oz)   SpO2 95%   Intake/Output last 3 Shifts:    Intake/Output Summary (Last 24 hours) at 1/27/2025 1030  Last data filed at 1/27/2025 0600  Gross per 24 hour   Intake 2267 ml   Output 925 ml   Net 1342 ml       Admission Weight  Weight: 54.1 kg (119 lb 4.3 oz) (01/22/25 1841)    Daily Weight  01/23/25 : 50.7 kg (111 lb 12.4 oz)    Image Results  CT head wo IV contrast  Narrative: Interpreted By:  Ashish Flaherty,   STUDY:  CT HEAD WO IV CONTRAST;  1/24/2025 5:52 pm      INDICATION:  Signs/Symptoms:altered mental status.          COMPARISON:  10/06/2023      ACCESSION NUMBER(S):  [id number]      ORDERING CLINICIAN:  RADHA RODRIGUEZ      TECHNIQUE:  Noncontrast axial CT images of head were obtained with coronal and  sagittal reconstructed images.      FINDINGS:  BRAIN PARENCHYMA:  No acute intraparenchymal hemorrhage or  parenchymal evidence of acute large territory ischemic infarct.  Gray-white matter distinction is preserved. No mass-effect.      VENTRICLES and EXTRA-AXIAL SPACES:  No acute extra-axial or  intraventricular hemorrhage. No effacement of cerebral sulci. The  ventricles and sulci are age-concordant.      PARANASAL SINUSES/MASTOIDS:  No hemorrhage or air-fluid levels within  the visualized paranasal sinuses. The mastoids are well aerated.      CALVARIUM/ORBITS:  No skull fracture.  The  orbits and globes are  intact to the extent visualized.      EXTRACRANIAL SOFT TISSUES: No discernible abnormality.      Impression: No acute intracranial abnormality.          MACRO:  None.      Signed by: Ashish Flaherty 1/24/2025 6:20 PM  Dictation workstation:   [id number]  XR chest abdomen for OG NG placement  Narrative: Interpreted By:  Stephen Omer,   STUDY:  XR CHEST ABDOMEN FOR OG NG PLACEMENT; 1/24/2025 2:24 pm      INDICATION:  Signs/Symptoms:verification of NG placement.      COMPARISON:  01/22/2025      ACCESSION NUMBER(S):  [id number]      ORDERING CLINICIAN:  DELIO WEBER      FINDINGS:  A single AP radiograph of the chest and upper abdomen was obtained.  An enteric tube is present, with the tip overlying the mid stomach.  Hazy opacity is seen at the left lung base, and may represent  atelectasis and/or pneumonia. There is a nonobstructive bowel gas  pattern present. Free intraperitoneal air and air-fluid levels cannot  be excluded without upright or decubitus images.      Impression: Enteric tube placement, as above.      MACRO:  None      Signed by: Stephen Omer 1/24/2025 3:41 PM  Dictation workstation:   [id number]    Results for orders placed or performed during the hospital encounter of 01/22/25 (from the past 24 hours)   Basic Metabolic Panel   Result Value Ref Range    Glucose 190 (H) 74 - 99 mg/dL    Sodium 141 136 - 145 mmol/L    Potassium 3.8 3.5 - 5.3 mmol/L    Chloride 102 98 - 107 mmol/L    Bicarbonate 32 21 - 32 mmol/L    Anion Gap 11 10 - 20 mmol/L    Urea Nitrogen 24 (H) 6 - 23 mg/dL    Creatinine 0.31 (L) 0.50 - 1.05 mg/dL    eGFR >90 >60 mL/min/1.73m*2    Calcium 8.0 (L) 8.6 - 10.3 mg/dL   CBC   Result Value Ref Range    WBC 9.0 4.4 - 11.3 x10*3/uL    nRBC 0.3 (H) 0.0 - 0.0 /100 WBCs    RBC 2.47 (L) 4.00 - 5.20 x10*6/uL    Hemoglobin 7.4 (L) 12.0 - 16.0 g/dL    Hematocrit 23.8 (L) 36.0 - 46.0 %    MCV 96 80 - 100 fL    MCH 30.0 26.0 - 34.0 pg    MCHC 31.1 (L) 32.0 - 36.0  g/dL    RDW 14.9 (H) 11.5 - 14.5 %    Platelets 321 150 - 450 x10*3/uL     Physical Exam  Vitals reviewed.   Constitutional:       Appearance: She is normal weight. She is ill-appearing. Improved.  HENT:      Head: Normocephalic and atraumatic.      Right Ear: External ear normal.      Left Ear: External ear normal.      Nose: Nose normal.      Mouth/Throat:      Mouth: Mucous membranes are moist.      Pharynx: Oropharynx is clear.   Eyes:      Conjunctiva/sclera: Conjunctivae normal.      Pupils: Pupils are equal, round, and reactive to light.   Cardiovascular:      Rate and Rhythm: Normal rate. Rhythm irregular.      Pulses: Normal pulses.      Heart sounds: Normal heart sounds.   Pulmonary:      Effort: Pulmonary effort is normal.      Breath sounds: Scattered rhonchi  Abdominal:      General: Bowel sounds are normal.      Palpations: Abdomen is soft.   Musculoskeletal:      Cervical back: Normal range of motion and neck supple.      Right lower leg: No edema.      Left lower leg: Edema present.      Bilateral hands with edema and contractures.     Comments: Foot drop, and upper body contracture   Skin:     General: Skin is warm and dry. Flushed  Neurological:      Mental Status: She is alert. Mental status is greatly improved.    Scheduled medications  amitriptyline, 10 mg, nasogastric tube, Nightly  amoxicillin-pot clavulanate, 1 tablet, oral, q12h MARII  ascorbic acid, 500 mg, nasogastric tube, Daily  azithromycin, 500 mg, oral, q24h MARII  bisacodyl, 10 mg, rectal, Daily  enoxaparin, 40 mg, subcutaneous, q24h  ferrous sulfate (325 mg ferrous sulfate), 65 mg of iron, oral, Daily with breakfast  methylPREDNISolone sodium succinate (PF), 125 mg, intravenous, q6h  oxybutynin, 5 mg, nasogastric tube, TID  PATIENT OWN PUMP baclofen (Lioresal) 633.1 mcg/day PATIENT SUPPLIED, , pump - intrathecal, Continuous Pump  perphenazine, 4 mg, nasogastric tube, Nightly  polyethylene glycol, 17 g, nasogastric tube,  Daily  pregabalin, 200 mg, nasogastric tube, BID  sennosides, 2 tablet, oral, BID      Continuous medications     PRN medications  PRN medications: acetaminophen **OR** acetaminophen, albuterol, benzocaine-menthol, benzonatate, calcium carbonate, lubricating eye drops, meclizine, melatonin, ondansetron, oxygen, simethicone, sodium chloride      Assessment/Plan      CAP vs Asp PNA  ARIE  Complex sleep apnea  Acute respiratory failure with hypoxia  - CT chest: Extensive nodular, ground-glass and confluent parenchymal opacities with lower lobe predominance suggestive of multifocal infectious/inflammatory process.  - SpO2 55% on RA during sleep  - supplemental oxygen to keep SpO2 > 90%  - was placed on oxygen 5lpm via NC during the night  - currently on RA  - patient has triology NIV at home  -  reports she will not wear the NIV here  - continue ceftriaxone 2 g daily and azithromycin 500 mg daily; day 5  - continue albuterol 2.5 mg q2h prn  - RT for bronchial hygiene  - blood cultures negative to date  - procalcitonin 0.25  ~transition ABX to oral  ~plan to taper steroid at 5-7 days.      #Dysphagia  ~known oropharyngeal delay  ~NGT with TF infusing  ~speech consulted with recommendations for regular diet     Hypokalemia~resolved  - K 2.7 on admission;  K 3.2>4.3>3.8  - replaced with IV KCL and PO KCL  - discontinue 0.9% NS with 40 mEq at 100 ml/hr  - monitor BMP     Non MI troponin elevation  - serial troponin 47 > 42  - serial EKG; reviewed no ST segment changes     Constipation  - CT abd: Extensive colonic stool burden suggestive of constipation.   - continue dulcolax 10 mg rectal daily  - continue colace 100 mg BID, senokot 17.2 g BID, miralax 17 g daily     Moderate protein calorie malnutrition  - dietician consult  - will place NGT 1/24/25  - continue TF per dietician's recommendations: Jevity 1.5 with a goal rate of 40 ml/hr  - water flushes 250 mL q6h     Normocytic anemia  Iron Deficiency  - iron 40, TIBC  229, % sat 17, Ferritin 255  - Hb 9.4 > 8.1 today  - monitor H&H  - will given IV venofer 300 mg 1/24/25  - started ferrous sulfate 325 mg daily and ascorbic acid 500 mg daily     Multiple Sclerosis, progressive  - continue baclofen pump 785 mcg/day  - continue amitriptyline 10 mg hs, perphenazine 4 mg hs  - continue solumedrol 125 mg q6h     OAB  - continue oxybutynin 5 mg TID     DVT ppx  - continue enoxaparin 40 mg daily     Code status: Full     Disposition: Patient requires more than 2 inpatient days.    Melinda Rosado, APRN-CNP

## 2025-01-27 NOTE — CARE PLAN
The patient's goals for the shift include      The clinical goals for the shift include Pt will tolerate treatments and medications as ordered this shift    Over the shift, the patient had an uneventful shift  VSS  No c/o pain  Tolerating IV antibiotic therapy  and steroid therapy Tolerating TF as ordered  Pt taking in pills with applesauce and states does have appetite for oral intake  Polo draining QS clear urine  Turned and repositioned q2h throughout shift  Safety measures maintained

## 2025-01-28 LAB
ANION GAP SERPL CALC-SCNC: 9 MMOL/L (ref 10–20)
BACTERIA BLD CULT: NORMAL
BACTERIA BLD CULT: NORMAL
BUN SERPL-MCNC: 20 MG/DL (ref 6–23)
CALCIUM SERPL-MCNC: 8.2 MG/DL (ref 8.6–10.3)
CHLORIDE SERPL-SCNC: 98 MMOL/L (ref 98–107)
CO2 SERPL-SCNC: 37 MMOL/L (ref 21–32)
CREAT SERPL-MCNC: 0.3 MG/DL (ref 0.5–1.05)
EGFRCR SERPLBLD CKD-EPI 2021: >90 ML/MIN/1.73M*2
ERYTHROCYTE [DISTWIDTH] IN BLOOD BY AUTOMATED COUNT: 14 % (ref 11.5–14.5)
GLUCOSE SERPL-MCNC: 140 MG/DL (ref 74–99)
HCT VFR BLD AUTO: 23.8 % (ref 36–46)
HGB BLD-MCNC: 7.6 G/DL (ref 12–16)
MCH RBC QN AUTO: 29.8 PG (ref 26–34)
MCHC RBC AUTO-ENTMCNC: 31.9 G/DL (ref 32–36)
MCV RBC AUTO: 93 FL (ref 80–100)
NRBC BLD-RTO: 0.6 /100 WBCS (ref 0–0)
PLATELET # BLD AUTO: 345 X10*3/UL (ref 150–450)
POTASSIUM SERPL-SCNC: 3.3 MMOL/L (ref 3.5–5.3)
RBC # BLD AUTO: 2.55 X10*6/UL (ref 4–5.2)
SODIUM SERPL-SCNC: 141 MMOL/L (ref 136–145)
WBC # BLD AUTO: 8.5 X10*3/UL (ref 4.4–11.3)

## 2025-01-28 PROCEDURE — 2500000002 HC RX 250 W HCPCS SELF ADMINISTERED DRUGS (ALT 637 FOR MEDICARE OP, ALT 636 FOR OP/ED): Mod: IPSPLIT | Performed by: HOSPITALIST

## 2025-01-28 PROCEDURE — 36415 COLL VENOUS BLD VENIPUNCTURE: CPT | Mod: IPSPLIT | Performed by: NURSE PRACTITIONER

## 2025-01-28 PROCEDURE — 94760 N-INVAS EAR/PLS OXIMETRY 1: CPT | Mod: IPSPLIT

## 2025-01-28 PROCEDURE — 2500000002 HC RX 250 W HCPCS SELF ADMINISTERED DRUGS (ALT 637 FOR MEDICARE OP, ALT 636 FOR OP/ED): Mod: IPSPLIT | Performed by: NURSE PRACTITIONER

## 2025-01-28 PROCEDURE — 99231 SBSQ HOSP IP/OBS SF/LOW 25: CPT | Performed by: NURSE PRACTITIONER

## 2025-01-28 PROCEDURE — 2500000004 HC RX 250 GENERAL PHARMACY W/ HCPCS (ALT 636 FOR OP/ED): Mod: IPSPLIT | Performed by: NURSE PRACTITIONER

## 2025-01-28 PROCEDURE — 2500000004 HC RX 250 GENERAL PHARMACY W/ HCPCS (ALT 636 FOR OP/ED): Mod: IPSPLIT | Performed by: HOSPITALIST

## 2025-01-28 PROCEDURE — 1100000001 HC PRIVATE ROOM DAILY: Mod: IPSPLIT

## 2025-01-28 PROCEDURE — 2500000001 HC RX 250 WO HCPCS SELF ADMINISTERED DRUGS (ALT 637 FOR MEDICARE OP): Mod: IPSPLIT | Performed by: NURSE PRACTITIONER

## 2025-01-28 PROCEDURE — 85027 COMPLETE CBC AUTOMATED: CPT | Mod: IPSPLIT | Performed by: NURSE PRACTITIONER

## 2025-01-28 PROCEDURE — 80048 BASIC METABOLIC PNL TOTAL CA: CPT | Mod: IPSPLIT | Performed by: NURSE PRACTITIONER

## 2025-01-28 RX ORDER — PREGABALIN 100 MG/1
200 CAPSULE ORAL 2 TIMES DAILY
Status: DISCONTINUED | OUTPATIENT
Start: 2025-01-28 | End: 2025-01-29 | Stop reason: HOSPADM

## 2025-01-28 RX ORDER — POLYETHYLENE GLYCOL 3350 17 G/17G
17 POWDER, FOR SOLUTION ORAL DAILY
Status: DISCONTINUED | OUTPATIENT
Start: 2025-01-29 | End: 2025-01-29 | Stop reason: HOSPADM

## 2025-01-28 RX ORDER — ACETAMINOPHEN 650 MG/1
650 SUPPOSITORY RECTAL EVERY 6 HOURS PRN
Status: DISCONTINUED | OUTPATIENT
Start: 2025-01-28 | End: 2025-01-29 | Stop reason: HOSPADM

## 2025-01-28 RX ORDER — ASCORBIC ACID 500 MG
500 TABLET ORAL DAILY
Status: DISCONTINUED | OUTPATIENT
Start: 2025-01-29 | End: 2025-01-29 | Stop reason: HOSPADM

## 2025-01-28 RX ORDER — PERPHENAZINE 4 MG/1
4 TABLET ORAL NIGHTLY
Status: DISCONTINUED | OUTPATIENT
Start: 2025-01-28 | End: 2025-01-29 | Stop reason: HOSPADM

## 2025-01-28 RX ORDER — LANOLIN ALCOHOL/MO/W.PET/CERES
400 CREAM (GRAM) TOPICAL ONCE
Status: COMPLETED | OUTPATIENT
Start: 2025-01-28 | End: 2025-01-28

## 2025-01-28 RX ORDER — OXYBUTYNIN CHLORIDE 5 MG/1
5 TABLET ORAL 3 TIMES DAILY
Status: DISCONTINUED | OUTPATIENT
Start: 2025-01-28 | End: 2025-01-29 | Stop reason: HOSPADM

## 2025-01-28 RX ORDER — ACETAMINOPHEN 325 MG/1
650 TABLET ORAL EVERY 6 HOURS PRN
Status: DISCONTINUED | OUTPATIENT
Start: 2025-01-28 | End: 2025-01-29 | Stop reason: HOSPADM

## 2025-01-28 RX ORDER — POTASSIUM CHLORIDE 20 MEQ/1
40 TABLET, EXTENDED RELEASE ORAL ONCE
Status: COMPLETED | OUTPATIENT
Start: 2025-01-28 | End: 2025-01-28

## 2025-01-28 RX ORDER — AMITRIPTYLINE HYDROCHLORIDE 10 MG/1
10 TABLET, FILM COATED ORAL NIGHTLY
Status: DISCONTINUED | OUTPATIENT
Start: 2025-01-28 | End: 2025-01-29 | Stop reason: HOSPADM

## 2025-01-28 RX ADMIN — Medication 400 MG: at 20:56

## 2025-01-28 RX ADMIN — AMOXICILLIN AND CLAVULANATE POTASSIUM 1 TABLET: 875; 125 TABLET, FILM COATED ORAL at 09:19

## 2025-01-28 RX ADMIN — OXYBUTYNIN CHLORIDE 5 MG: 5 TABLET ORAL at 20:56

## 2025-01-28 RX ADMIN — PREGABALIN 200 MG: 100 CAPSULE ORAL at 09:20

## 2025-01-28 RX ADMIN — ENOXAPARIN SODIUM 40 MG: 100 INJECTION SUBCUTANEOUS at 20:56

## 2025-01-28 RX ADMIN — PREGABALIN 200 MG: 100 CAPSULE ORAL at 20:56

## 2025-01-28 RX ADMIN — AMOXICILLIN AND CLAVULANATE POTASSIUM 1 TABLET: 875; 125 TABLET, FILM COATED ORAL at 20:56

## 2025-01-28 RX ADMIN — PREDNISONE 60 MG: 20 TABLET ORAL at 09:19

## 2025-01-28 RX ADMIN — POTASSIUM CHLORIDE 40 MEQ: 1500 TABLET, EXTENDED RELEASE ORAL at 20:56

## 2025-01-28 RX ADMIN — FERROUS SULFATE TAB 325 MG (65 MG ELEMENTAL FE) 325 MG: 325 (65 FE) TAB at 09:22

## 2025-01-28 RX ADMIN — OXYBUTYNIN CHLORIDE 5 MG: 5 TABLET ORAL at 09:20

## 2025-01-28 RX ADMIN — OXYBUTYNIN CHLORIDE 5 MG: 5 TABLET ORAL at 14:24

## 2025-01-28 RX ADMIN — OXYCODONE HYDROCHLORIDE AND ACETAMINOPHEN 500 MG: 500 TABLET ORAL at 09:20

## 2025-01-28 RX ADMIN — PERPHENAZINE 4 MG: 4 TABLET, FILM COATED ORAL at 20:56

## 2025-01-28 RX ADMIN — AMITRIPTYLINE HYDROCHLORIDE 10 MG: 10 TABLET, FILM COATED ORAL at 20:56

## 2025-01-28 ASSESSMENT — PAIN - FUNCTIONAL ASSESSMENT
PAIN_FUNCTIONAL_ASSESSMENT: 0-10

## 2025-01-28 ASSESSMENT — COGNITIVE AND FUNCTIONAL STATUS - GENERAL
TOILETING: TOTAL
DRESSING REGULAR UPPER BODY CLOTHING: TOTAL
EATING MEALS: TOTAL
CLIMB 3 TO 5 STEPS WITH RAILING: TOTAL
MOBILITY SCORE: 6
STANDING UP FROM CHAIR USING ARMS: TOTAL
MOVING TO AND FROM BED TO CHAIR: TOTAL
MOVING FROM LYING ON BACK TO SITTING ON SIDE OF FLAT BED WITH BEDRAILS: TOTAL
TURNING FROM BACK TO SIDE WHILE IN FLAT BAD: TOTAL
HELP NEEDED FOR BATHING: TOTAL
WALKING IN HOSPITAL ROOM: TOTAL
DAILY ACTIVITIY SCORE: 6
PERSONAL GROOMING: TOTAL
DRESSING REGULAR LOWER BODY CLOTHING: TOTAL

## 2025-01-28 ASSESSMENT — PAIN SCALES - GENERAL
PAINLEVEL_OUTOF10: 0 - NO PAIN

## 2025-01-28 NOTE — CARE PLAN
The patient's goals for the shift include      The clinical goals for the shift include pt will tolerate treatments and medications as ordered this shift    Over the shift, the patient did not make progress toward the following goals. Barriers to progression include pneumonia, ms exacerbation. Recommendations to address these barriers include medication therapy.

## 2025-01-28 NOTE — NURSING NOTE
Patient remains stable. Alert and oriented with some periods of confusion per baseline. Plan for discharge to home tomorrow with  transporting. Continue to be total care and total feed. Vitals all wnl. Call light within reach.

## 2025-01-28 NOTE — PROGRESS NOTES
Shameka Zaragoza is a 55 y.o. female on day 5 of admission presenting with Hypokalemia.      Subjective   Shameka is seen in her bed in no acute distress. The patient feels that she is ready for discharge. Spouse feels that she is having intermittent confusion and would like one more day of in hospital stay to ensure that she does no regress health boateng.   Will plan for discharge in the am.        Objective     Last Recorded Vitals  /72 (BP Location: Right arm, Patient Position: Sitting)   Pulse 66   Temp 36 °C (96.8 °F) (Temporal)   Resp 18   Wt 50.7 kg (111 lb 12.4 oz)   SpO2 95%   Intake/Output last 3 Shifts:    Intake/Output Summary (Last 24 hours) at 1/28/2025 1323  Last data filed at 1/28/2025 0900  Gross per 24 hour   Intake 360 ml   Output 2100 ml   Net -1740 ml       Admission Weight  Weight: 54.1 kg (119 lb 4.3 oz) (01/22/25 1841)    Daily Weight  01/23/25 : 50.7 kg (111 lb 12.4 oz)    Image Results  CT head wo IV contrast  Narrative: Interpreted By:  Ashish Flaherty,   STUDY:  CT HEAD WO IV CONTRAST;  1/24/2025 5:52 pm      INDICATION:  Signs/Symptoms:altered mental status.          COMPARISON:  10/06/2023      ACCESSION NUMBER(S):  [id number]      ORDERING CLINICIAN:  RADHA RODRIGUEZ      TECHNIQUE:  Noncontrast axial CT images of head were obtained with coronal and  sagittal reconstructed images.      FINDINGS:  BRAIN PARENCHYMA:  No acute intraparenchymal hemorrhage or  parenchymal evidence of acute large territory ischemic infarct.  Gray-white matter distinction is preserved. No mass-effect.      VENTRICLES and EXTRA-AXIAL SPACES:  No acute extra-axial or  intraventricular hemorrhage. No effacement of cerebral sulci. The  ventricles and sulci are age-concordant.      PARANASAL SINUSES/MASTOIDS:  No hemorrhage or air-fluid levels within  the visualized paranasal sinuses. The mastoids are well aerated.      CALVARIUM/ORBITS:  No skull fracture.  The orbits and globes are  intact to the  extent visualized.      EXTRACRANIAL SOFT TISSUES: No discernible abnormality.      Impression: No acute intracranial abnormality.          MACRO:  None.      Signed by: Ashish Flaherty 1/24/2025 6:20 PM  Dictation workstation:   [id number]  XR chest abdomen for OG NG placement  Narrative: Interpreted By:  Stephen Omer,   STUDY:  XR CHEST ABDOMEN FOR OG NG PLACEMENT; 1/24/2025 2:24 pm      INDICATION:  Signs/Symptoms:verification of NG placement.      COMPARISON:  01/22/2025      ACCESSION NUMBER(S):  [id number]      ORDERING CLINICIAN:  DELIO WEBER      FINDINGS:  A single AP radiograph of the chest and upper abdomen was obtained.  An enteric tube is present, with the tip overlying the mid stomach.  Hazy opacity is seen at the left lung base, and may represent  atelectasis and/or pneumonia. There is a nonobstructive bowel gas  pattern present. Free intraperitoneal air and air-fluid levels cannot  be excluded without upright or decubitus images.      Impression: Enteric tube placement, as above.      MACRO:  None      Signed by: Stephen Omer 1/24/2025 3:41 PM  Dictation workstation:   [id number]    Results for orders placed or performed during the hospital encounter of 01/22/25 (from the past 24 hours)   Basic Metabolic Panel   Result Value Ref Range    Glucose 140 (H) 74 - 99 mg/dL    Sodium 141 136 - 145 mmol/L    Potassium 3.3 (L) 3.5 - 5.3 mmol/L    Chloride 98 98 - 107 mmol/L    Bicarbonate 37 (H) 21 - 32 mmol/L    Anion Gap 9 (L) 10 - 20 mmol/L    Urea Nitrogen 20 6 - 23 mg/dL    Creatinine 0.30 (L) 0.50 - 1.05 mg/dL    eGFR >90 >60 mL/min/1.73m*2    Calcium 8.2 (L) 8.6 - 10.3 mg/dL   CBC   Result Value Ref Range    WBC 8.5 4.4 - 11.3 x10*3/uL    nRBC 0.6 (H) 0.0 - 0.0 /100 WBCs    RBC 2.55 (L) 4.00 - 5.20 x10*6/uL    Hemoglobin 7.6 (L) 12.0 - 16.0 g/dL    Hematocrit 23.8 (L) 36.0 - 46.0 %    MCV 93 80 - 100 fL    MCH 29.8 26.0 - 34.0 pg    MCHC 31.9 (L) 32.0 - 36.0 g/dL    RDW 14.0 11.5 - 14.5 %     Platelets 345 150 - 450 x10*3/uL         Physical Exam    Relevant Results               Assessment/Plan      Physical Exam  Vitals reviewed.   Constitutional:       Appearance: She is normal weight. She is Improved.  HENT:      Head: Normocephalic and atraumatic.      Right Ear: External ear normal.      Left Ear: External ear normal.      Nose: Nose normal.      Mouth/Throat:      Mouth: Mucous membranes are moist.      Pharynx: Oropharynx is clear.   Eyes:      Conjunctiva/sclera: Conjunctivae normal.      Pupils: Pupils are equal, round, and reactive to light.   Cardiovascular:      Rate and Rhythm: Normal rate. Rhythm irregular.      Pulses: Normal pulses.      Heart sounds: Normal heart sounds.   Pulmonary:      Effort: Pulmonary effort is normal.      Breath sounds: clear  Abdominal:      General: Bowel sounds are normal.      Palpations: Abdomen is soft.   Musculoskeletal:      Cervical back: Normal range of motion and neck supple.      Right lower leg: No edema.      Left lower leg: Edema present. dependent     Bilateral hands with edema and contractures.     Comments: Foot drop, and upper body contracture   Skin:     General: Skin is warm and dry. Flushed  Neurological:      Mental Status: She is alert. Mental status is greatly improved.    Assessment/Plan  CAP vs Asp PNA  ARIE  Complex sleep apnea  Acute respiratory failure with hypoxia  - CT chest: Extensive nodular, ground-glass and confluent parenchymal opacities with lower lobe predominance suggestive of multifocal infectious/inflammatory process.  - SpO2 55% on RA during sleep  - supplemental oxygen to keep SpO2 > 90%  - was placed on oxygen 5lpm via NC during the night  - currently on RA  - patient has triology NIV at home  -  reports she will not wear the NIV here  - continue ceftriaxone 2 g daily and azithromycin 500 mg daily; day 5  - continue albuterol 2.5 mg q2h prn  - RT for bronchial hygiene  - blood cultures negative to date  -  procalcitonin 0.25  ~transition ABX to oral  ~plan to taper steroid at 5-7 days.      #Dysphagia  ~known oropharyngeal delay  ~NGT with TF infusing  ~speech consulted with recommendations for regular diet     Hypokalemia~resolved  - K 2.7 on admission;  K 3.2>4.3>3.8  - replaced with IV KCL and PO KCL  - discontinue 0.9% NS with 40 mEq at 100 ml/hr  - monitor BMP     Non MI troponin elevation  - serial troponin 47 > 42  - serial EKG; reviewed no ST segment changes     Constipation  - CT abd: Extensive colonic stool burden suggestive of constipation.   - continue dulcolax 10 mg rectal daily  - continue colace 100 mg BID, senokot 17.2 g BID, miralax 17 g daily     Moderate protein calorie malnutrition  - dietician consult  - will place NGT 1/24/25  - continue TF per dietician's recommendations: Jevity 1.5 with a goal rate of 40 ml/hr  - water flushes 250 mL q6h     Normocytic anemia  Iron Deficiency  - iron 40, TIBC 229, % sat 17, Ferritin 255  - Hb 9.4 > 8.1 today  - monitor H&H  - will given IV venofer 300 mg 1/24/25  - started ferrous sulfate 325 mg daily and ascorbic acid 500 mg daily     Multiple Sclerosis, progressive  - continue baclofen pump 785 mcg/day  - continue amitriptyline 10 mg hs, perphenazine 4 mg hs  - continue solumedrol 125 mg q6h     OAB  - continue oxybutynin 5 mg TID     DVT ppx  - continue enoxaparin 40 mg daily     Code status: Full     Disposition: Patient requires more than 2 inpatient days. Plan for discharge in am.    Seen in collaboration with Dr. Diego Rosado, APRN-CNP

## 2025-01-28 NOTE — PROGRESS NOTES
01/28/25 1357   Discharge Planning   Living Arrangements Spouse/significant other   Support Systems Spouse/significant other   Assistance Needed Resources for home aide , respite.   Type of Residence Private residence   Expected Discharge Disposition Home   Stroke Family Assessment   Stroke Family Assessment Needed No   Intensity of Service   Intensity of Service 0-30 min     Care Transition Progress Note 01/28/25    Patient: Shameka Zaragoza    Diagnosis: Hypokalemia    Patient Concerns: Spoke with pt and her  today and gave information about hospice to help with resources that are not available but would be under Medicare. Pt  reports that he will contact the agency and get the information and is receptive to the accepting help. Pt  thanked [facility] for the information and also for the home respite phone numbers to other agencies.     Patient Needs: Resources    Care Transition Social Worker explained discharge process today. Pt is reported to go home tomorrow. MATTEO MA

## 2025-01-29 VITALS
SYSTOLIC BLOOD PRESSURE: 137 MMHG | WEIGHT: 111.77 LBS | OXYGEN SATURATION: 91 % | BODY MASS INDEX: 18.62 KG/M2 | HEART RATE: 92 BPM | DIASTOLIC BLOOD PRESSURE: 86 MMHG | TEMPERATURE: 99.7 F | HEIGHT: 65 IN | RESPIRATION RATE: 18 BRPM

## 2025-01-29 PROBLEM — E87.6 HYPOKALEMIA: Status: RESOLVED | Noted: 2025-01-22 | Resolved: 2025-01-29

## 2025-01-29 PROBLEM — E43 SEVERE PROTEIN-CALORIE MALNUTRITION (MULTI): Status: RESOLVED | Noted: 2023-10-06 | Resolved: 2025-01-29

## 2025-01-29 PROBLEM — R79.89 ELEVATED TROPONIN LEVEL NOT DUE MYOCARDIAL INFARCTION: Status: RESOLVED | Noted: 2025-01-23 | Resolved: 2025-01-29

## 2025-01-29 PROBLEM — J96.01 ACUTE RESPIRATORY FAILURE WITH HYPOXIA (MULTI): Status: RESOLVED | Noted: 2025-01-23 | Resolved: 2025-01-29

## 2025-01-29 PROBLEM — E86.0 DEHYDRATION: Status: RESOLVED | Noted: 2023-10-06 | Resolved: 2025-01-29

## 2025-01-29 PROBLEM — J18.9 PNEUMONIA OF BOTH LUNGS DUE TO INFECTIOUS ORGANISM: Status: RESOLVED | Noted: 2023-10-06 | Resolved: 2025-01-29

## 2025-01-29 LAB
ANION GAP SERPL CALC-SCNC: 10 MMOL/L (ref 10–20)
BUN SERPL-MCNC: 26 MG/DL (ref 6–23)
CALCIUM SERPL-MCNC: 8.2 MG/DL (ref 8.6–10.3)
CHLORIDE SERPL-SCNC: 97 MMOL/L (ref 98–107)
CO2 SERPL-SCNC: 36 MMOL/L (ref 21–32)
CREAT SERPL-MCNC: 0.3 MG/DL (ref 0.5–1.05)
EGFRCR SERPLBLD CKD-EPI 2021: >90 ML/MIN/1.73M*2
ERYTHROCYTE [DISTWIDTH] IN BLOOD BY AUTOMATED COUNT: 14.4 % (ref 11.5–14.5)
GLUCOSE SERPL-MCNC: 87 MG/DL (ref 74–99)
HCT VFR BLD AUTO: 25.3 % (ref 36–46)
HGB BLD-MCNC: 8.3 G/DL (ref 12–16)
MAGNESIUM SERPL-MCNC: 1.79 MG/DL (ref 1.6–2.4)
MCH RBC QN AUTO: 30.6 PG (ref 26–34)
MCHC RBC AUTO-ENTMCNC: 32.8 G/DL (ref 32–36)
MCV RBC AUTO: 93 FL (ref 80–100)
NRBC BLD-RTO: 0.9 /100 WBCS (ref 0–0)
PLATELET # BLD AUTO: 381 X10*3/UL (ref 150–450)
POTASSIUM SERPL-SCNC: 3.9 MMOL/L (ref 3.5–5.3)
RBC # BLD AUTO: 2.71 X10*6/UL (ref 4–5.2)
SODIUM SERPL-SCNC: 139 MMOL/L (ref 136–145)
WBC # BLD AUTO: 10.9 X10*3/UL (ref 4.4–11.3)

## 2025-01-29 PROCEDURE — 94760 N-INVAS EAR/PLS OXIMETRY 1: CPT | Mod: IPSPLIT

## 2025-01-29 PROCEDURE — 2500000004 HC RX 250 GENERAL PHARMACY W/ HCPCS (ALT 636 FOR OP/ED): Mod: IPSPLIT | Performed by: NURSE PRACTITIONER

## 2025-01-29 PROCEDURE — 83735 ASSAY OF MAGNESIUM: CPT | Mod: IPSPLIT | Performed by: HOSPITALIST

## 2025-01-29 PROCEDURE — 85027 COMPLETE CBC AUTOMATED: CPT | Mod: IPSPLIT | Performed by: NURSE PRACTITIONER

## 2025-01-29 PROCEDURE — 2500000002 HC RX 250 W HCPCS SELF ADMINISTERED DRUGS (ALT 637 FOR MEDICARE OP, ALT 636 FOR OP/ED): Mod: IPSPLIT | Performed by: NURSE PRACTITIONER

## 2025-01-29 PROCEDURE — 82374 ASSAY BLOOD CARBON DIOXIDE: CPT | Mod: IPSPLIT | Performed by: NURSE PRACTITIONER

## 2025-01-29 PROCEDURE — 80048 BASIC METABOLIC PNL TOTAL CA: CPT | Mod: IPSPLIT | Performed by: NURSE PRACTITIONER

## 2025-01-29 PROCEDURE — 99238 HOSP IP/OBS DSCHRG MGMT 30/<: CPT | Performed by: NURSE PRACTITIONER

## 2025-01-29 PROCEDURE — 36415 COLL VENOUS BLD VENIPUNCTURE: CPT | Mod: IPSPLIT | Performed by: NURSE PRACTITIONER

## 2025-01-29 PROCEDURE — 2500000001 HC RX 250 WO HCPCS SELF ADMINISTERED DRUGS (ALT 637 FOR MEDICARE OP): Mod: IPSPLIT | Performed by: NURSE PRACTITIONER

## 2025-01-29 RX ORDER — ASCORBIC ACID 500 MG
500 TABLET ORAL DAILY
Qty: 30 TABLET | Refills: 11 | Status: SHIPPED | OUTPATIENT
Start: 2025-01-29

## 2025-01-29 RX ORDER — PREDNISONE 10 MG/1
TABLET ORAL
Qty: 7 TABLET | Refills: 0 | Status: SHIPPED | OUTPATIENT
Start: 2025-01-29 | End: 2025-02-08

## 2025-01-29 RX ORDER — FERROUS SULFATE 325(65) MG
65 TABLET ORAL
Qty: 30 TABLET | Refills: 11 | Status: SHIPPED | OUTPATIENT
Start: 2025-01-29

## 2025-01-29 RX ORDER — PERPHENAZINE 4 MG/1
4 TABLET ORAL NIGHTLY
Qty: 30 TABLET | Refills: 11 | Status: SHIPPED | OUTPATIENT
Start: 2025-01-29

## 2025-01-29 RX ADMIN — OXYBUTYNIN CHLORIDE 5 MG: 5 TABLET ORAL at 08:26

## 2025-01-29 RX ADMIN — AMOXICILLIN AND CLAVULANATE POTASSIUM 1 TABLET: 875; 125 TABLET, FILM COATED ORAL at 08:24

## 2025-01-29 RX ADMIN — ACETAMINOPHEN 650 MG: 325 TABLET ORAL at 12:48

## 2025-01-29 RX ADMIN — PREGABALIN 200 MG: 100 CAPSULE ORAL at 08:27

## 2025-01-29 RX ADMIN — FERROUS SULFATE TAB 325 MG (65 MG ELEMENTAL FE) 325 MG: 325 (65 FE) TAB at 08:25

## 2025-01-29 RX ADMIN — PREDNISONE 40 MG: 20 TABLET ORAL at 08:26

## 2025-01-29 RX ADMIN — ONDANSETRON 4 MG: 2 INJECTION INTRAMUSCULAR; INTRAVENOUS at 12:48

## 2025-01-29 RX ADMIN — OXYCODONE HYDROCHLORIDE AND ACETAMINOPHEN 500 MG: 500 TABLET ORAL at 08:24

## 2025-01-29 ASSESSMENT — COGNITIVE AND FUNCTIONAL STATUS - GENERAL
CLIMB 3 TO 5 STEPS WITH RAILING: TOTAL
PERSONAL GROOMING: TOTAL
MOVING TO AND FROM BED TO CHAIR: TOTAL
TOILETING: TOTAL
MOBILITY SCORE: 6
DRESSING REGULAR UPPER BODY CLOTHING: TOTAL
DAILY ACTIVITIY SCORE: 6
STANDING UP FROM CHAIR USING ARMS: TOTAL
TURNING FROM BACK TO SIDE WHILE IN FLAT BAD: TOTAL
EATING MEALS: TOTAL
HELP NEEDED FOR BATHING: TOTAL
WALKING IN HOSPITAL ROOM: TOTAL
MOVING FROM LYING ON BACK TO SITTING ON SIDE OF FLAT BED WITH BEDRAILS: TOTAL
DRESSING REGULAR LOWER BODY CLOTHING: TOTAL

## 2025-01-29 ASSESSMENT — PAIN SCALES - GENERAL
PAINLEVEL_OUTOF10: 0 - NO PAIN
PAINLEVEL_OUTOF10: 0 - NO PAIN
PAINLEVEL_OUTOF10: 2
PAINLEVEL_OUTOF10: 0 - NO PAIN

## 2025-01-29 ASSESSMENT — PAIN - FUNCTIONAL ASSESSMENT
PAIN_FUNCTIONAL_ASSESSMENT: 0-10
PAIN_FUNCTIONAL_ASSESSMENT: 0-10

## 2025-01-29 NOTE — PROGRESS NOTES
01/29/25 1202   Discharge Planning   Living Arrangements Spouse/significant other   Support Systems Spouse/significant other   Assistance Needed Hospice referral to obtain nurses aid assisatance.   Type of Residence Private residence   Expected Discharge Disposition HospiceHome   Stroke Family Assessment   Stroke Family Assessment Needed No   Intensity of Service   Intensity of Service 0-30 min     Care Transition Progress Note 01/29/25    Patient: Shameka Zaragoza    Diagnosis: Hypokolemia    Patient Concerns: Pt was provided resources for hospice by LSW and pt  has appt tomorrow set up with Hospice to review resources. Pt  pleased with outcome today.     Patient Needs: Nurse aid, respite.     Care Transition Social Worker explained discharge process and reviewed Medicare Rights and had patient sign the Medicare Rights today and provided copy to the patient. MATTEO MA    Yes

## 2025-01-29 NOTE — DISCHARGE SUMMARY
Discharge Diagnosis  Hypokalemia    Issues Requiring Follow-Up  None    Discharge Meds     Medication List      START taking these medications     ascorbic acid 500 mg tablet; Commonly known as: Vitamin C; Take 1 tablet   (500 mg) by mouth once daily.   ferrous sulfate (325 mg ferrous sulfate) tablet; Take 1 tablet (325 mg)   by mouth once daily with breakfast.   perphenazine 4 mg tablet; Take 1 tablet (4 mg) by mouth once daily at   bedtime.; Replaces: perphenazine-amitriptyline 4-10 mg tablet   predniSONE 10 mg tablet; Commonly known as: Deltasone; Take 4 tablets   (40 mg) by mouth once daily for 1 day, THEN 2 tablets (20 mg) once daily   for 1 day, THEN 1 tablet (10 mg) once daily for 1 day.; Start taking on:   January 29, 2025     CONTINUE taking these medications     amitriptyline 10 mg tablet; Commonly known as: Elavil   baclofen 2,000 mcg/mL intrathecal injection; Commonly known as: Lioresal   biotin 1 mg tablet   meclizine 25 mg tablet; Commonly known as: Antivert   Myrbetriq 25 mg tablet extended release 24 hr 24 hr tablet; Generic   drug: mirabegron   nitrofurantoin 100 mg capsule; Commonly known as: Macrodantin   ondansetron 4 mg tablet; Commonly known as: Zofran   pregabalin 200 mg capsule; Commonly known as: Lyrica   solifenacin 10 mg tablet; Commonly known as: VESIcare     STOP taking these medications     perphenazine-amitriptyline 4-10 mg tablet; Replaced by: perphenazine 4   mg tablet       Test Results Pending At Discharge  Pending Labs       No current pending labs.            Hospital Course   Shameka Zaragoza is a 54 y.o. female presenting with decreased oral intake for multiple days. She has progressive multiple sclerosis and had not been wanting to eat or drink since 1/19/25. There were no fever, or chills. She had been more fatigued and with increased sleepiness.  was unable to get her to take anything by mouth. Radiology: CXR no acute cardiopulmonary disease. CT chest/abd extensive  nodular, ground-glass and confluent parenchymal opacities with lower lobe predominance suggestive of multifocal infectious/inflammatory process. Extensive colonic stool burden suggestive of constipation. She was treated with IVF and subsequently an NGT was placed and patient was started on tube feedings. Antibiotic treatment included rocephin and azithromycin. She was transitioned to augmentin for the duration of treatment that completed during this hospitalization. She has know oropharyngeal dysphagia and was seen by speech and cleared for a regular diet. Electrolytes replaced and currently stable. Iron and vitamin C initiated for iron deficiency anemia.     Pertinent Physical Exam At Time of Discharge  Vitals reviewed.   Constitutional:       Appearance: She is normal weight. She is Improved.  HENT:      Head: Normocephalic and atraumatic.      Right Ear: External ear normal.      Left Ear: External ear normal.      Nose: Nose normal.      Mouth/Throat:      Mouth: Mucous membranes are moist.      Pharynx: Oropharynx is clear.   Eyes:      Conjunctiva/sclera: Conjunctivae normal.      Pupils: Pupils are equal, round, and reactive to light.   Cardiovascular:      Rate and Rhythm: Normal rate. Rhythm irregular.      Pulses: Normal pulses.      Heart sounds: Normal heart sounds.   Pulmonary:      Effort: Pulmonary effort is normal.      Breath sounds: clear  Abdominal:      General: Bowel sounds are normal.      Palpations: Abdomen is soft.   Musculoskeletal:      Cervical back: Normal range of motion and neck supple.      Right lower leg: No edema.      Left lower leg: Edema present. dependent     Bilateral hands with edema and contractures.     Comments: Foot drop, and upper body contracture   Skin:     General: Skin is warm and dry. Flushed  Neurological:      Mental Status: She is alert. Mental status is greatly improved.    Outpatient Follow-Up  No future appointments.    Seen in collaboration with Dr. Cortez  Donta Rosado, APRN-CNP

## 2025-01-29 NOTE — PROGRESS NOTES
Nutrition Diet Education:   Nutrition Assessment       Nutrition History:  Food and Nutrient History: Pt discussed at IDT meeting. Per nursing,  requesting information regarding pt's low electrolytes upon admission and how to treat at home.  On admission Na+ 134, K+ 2.7, glu 69. Pt and  visited in room getting ready for discharge. Pt and  educated on benefits of electrolyte beverages. High potassium foods also discussed.     I/O:   Last BM Date: 01/28/25; Stool Appearance: Loose, Soft (01/29/25 1304)    Dietary Orders (From admission, onward)       Start     Ordered    01/27/25 1438  Oral nutritional supplements  Until discontinued        Comments: Honor flavor preference   Question Answer Comment   Deliver with All meals    Select supplement: Ensure Plus High Protein        01/27/25 1437    01/27/25 1030  Adult diet Regular  Diet effective now        Question:  Diet type  Answer:  Regular    01/27/25 1029    01/23/25 0116  May Participate in Room Service With Assistance  ( ROOM SERVICE MAY PARTICIPATE WITH ASSISTANCE)  Once        Question:  .  Answer:  Yes    01/23/25 0115                  Education Documentation  Nutrition Care Manual, taught by Yi Lentz RD at 1/29/2025  1:56 PM.  Learner: Significant Other, Patient  Readiness: Eager  Method: Explanation  Response: Verbalizes Understanding  Comment: Educated on food and beverages high in potassium      Time Spent (min): 30 minutes

## 2025-02-14 LAB
ATRIAL RATE: 67 BPM
ATRIAL RATE: 67 BPM
P AXIS: 66 DEGREES
P AXIS: 70 DEGREES
P OFFSET: 195 MS
P OFFSET: 202 MS
P ONSET: 143 MS
P ONSET: 146 MS
PR INTERVAL: 150 MS
PR INTERVAL: 154 MS
Q ONSET: 220 MS
Q ONSET: 221 MS
QRS COUNT: 11 BEATS
QRS COUNT: 11 BEATS
QRS DURATION: 98 MS
QRS DURATION: 98 MS
QT INTERVAL: 414 MS
QT INTERVAL: 416 MS
QTC CALCULATION(BAZETT): 437 MS
QTC CALCULATION(BAZETT): 439 MS
QTC FREDERICIA: 429 MS
QTC FREDERICIA: 431 MS
R AXIS: 59 DEGREES
R AXIS: 61 DEGREES
T AXIS: 52 DEGREES
T AXIS: 55 DEGREES
T OFFSET: 428 MS
T OFFSET: 428 MS
VENTRICULAR RATE: 67 BPM
VENTRICULAR RATE: 67 BPM